# Patient Record
Sex: MALE | Race: ASIAN | NOT HISPANIC OR LATINO | Employment: FULL TIME | URBAN - METROPOLITAN AREA
[De-identification: names, ages, dates, MRNs, and addresses within clinical notes are randomized per-mention and may not be internally consistent; named-entity substitution may affect disease eponyms.]

---

## 2021-07-06 ENCOUNTER — TRANSFERRED RECORDS (OUTPATIENT)
Dept: MULTI SPECIALTY CLINIC | Facility: CLINIC | Age: 46
End: 2021-07-06

## 2022-07-25 ENCOUNTER — OFFICE VISIT (OUTPATIENT)
Dept: FAMILY MEDICINE | Facility: CLINIC | Age: 47
End: 2022-07-25
Payer: COMMERCIAL

## 2022-07-25 VITALS
WEIGHT: 190 LBS | HEART RATE: 86 BPM | DIASTOLIC BLOOD PRESSURE: 104 MMHG | SYSTOLIC BLOOD PRESSURE: 150 MMHG | OXYGEN SATURATION: 98 % | HEIGHT: 63 IN | BODY MASS INDEX: 33.66 KG/M2 | RESPIRATION RATE: 18 BRPM | TEMPERATURE: 97.1 F

## 2022-07-25 DIAGNOSIS — D22.9 BENIGN PIGMENTED MOLE: ICD-10-CM

## 2022-07-25 DIAGNOSIS — I10 ESSENTIAL HYPERTENSION: ICD-10-CM

## 2022-07-25 DIAGNOSIS — K57.90 DIVERTICULOSIS: ICD-10-CM

## 2022-07-25 DIAGNOSIS — K52.9 GASTROENTERITIS: ICD-10-CM

## 2022-07-25 DIAGNOSIS — R10.32 LLQ ABDOMINAL PAIN: Primary | ICD-10-CM

## 2022-07-25 LAB
BASOPHILS # BLD AUTO: 0 10E3/UL (ref 0–0.2)
BASOPHILS NFR BLD AUTO: 0 %
EOSINOPHIL # BLD AUTO: 0.4 10E3/UL (ref 0–0.7)
EOSINOPHIL NFR BLD AUTO: 5 %
ERYTHROCYTE [DISTWIDTH] IN BLOOD BY AUTOMATED COUNT: 13.2 % (ref 10–15)
HCT VFR BLD AUTO: 41.9 % (ref 40–53)
HGB BLD-MCNC: 13.9 G/DL (ref 13.3–17.7)
IMM GRANULOCYTES # BLD: 0.1 10E3/UL
IMM GRANULOCYTES NFR BLD: 1 %
LYMPHOCYTES # BLD AUTO: 2.9 10E3/UL (ref 0.8–5.3)
LYMPHOCYTES NFR BLD AUTO: 36 %
MCH RBC QN AUTO: 28 PG (ref 26.5–33)
MCHC RBC AUTO-ENTMCNC: 33.2 G/DL (ref 31.5–36.5)
MCV RBC AUTO: 84 FL (ref 78–100)
MONOCYTES # BLD AUTO: 0.6 10E3/UL (ref 0–1.3)
MONOCYTES NFR BLD AUTO: 7 %
NEUTROPHILS # BLD AUTO: 4 10E3/UL (ref 1.6–8.3)
NEUTROPHILS NFR BLD AUTO: 51 %
PLATELET # BLD AUTO: 375 10E3/UL (ref 150–450)
RBC # BLD AUTO: 4.97 10E6/UL (ref 4.4–5.9)
WBC # BLD AUTO: 7.9 10E3/UL (ref 4–11)

## 2022-07-25 PROCEDURE — 99204 OFFICE O/P NEW MOD 45 MIN: CPT | Performed by: INTERNAL MEDICINE

## 2022-07-25 PROCEDURE — 36415 COLL VENOUS BLD VENIPUNCTURE: CPT | Performed by: INTERNAL MEDICINE

## 2022-07-25 PROCEDURE — 85025 COMPLETE CBC W/AUTO DIFF WBC: CPT | Performed by: INTERNAL MEDICINE

## 2022-07-25 RX ORDER — AMLODIPINE BESYLATE 5 MG/1
5 TABLET ORAL DAILY
Qty: 90 TABLET | Refills: 3 | Status: SHIPPED | OUTPATIENT
Start: 2022-07-25 | End: 2023-07-24

## 2022-07-25 RX ORDER — ESCITALOPRAM OXALATE 5 MG/1
5 TABLET ORAL DAILY
COMMUNITY
Start: 2022-05-03 | End: 2024-08-09

## 2022-07-25 RX ORDER — CIPROFLOXACIN 500 MG/1
500 TABLET, FILM COATED ORAL 2 TIMES DAILY
Qty: 10 TABLET | Refills: 0 | Status: SHIPPED | OUTPATIENT
Start: 2022-07-25 | End: 2022-07-30

## 2022-07-25 ASSESSMENT — PAIN SCALES - GENERAL: PAINLEVEL: MODERATE PAIN (4)

## 2022-07-25 ASSESSMENT — PATIENT HEALTH QUESTIONNAIRE - PHQ9: SUM OF ALL RESPONSES TO PHQ QUESTIONS 1-9: 1

## 2022-07-25 NOTE — PROGRESS NOTES
Lenore Ortiz is a 46 year old, presenting for the following health issues:  Abdominal Pain and Health Maintenance (Last Colonoscopy done on 07/06/2021 at [facility])      HPI     Chief Complaint:     LLQ abdominal pains, gastroenteritis    HPI:     LLQ abdominal Pains    Duration:     Since: recent            Specific cause: gastroenteritis    Description:      Location of pain: LLQ abdomen     Character of pain: dull     Pain radiation: none    Intensity: moderate    History:      Pain interferes with job: yes     History of similar pain problems: yes      Any previous MRI or X-rays: no     Therapies tried without relief: none    Alleviating factors:      Improved by: none    Precipitating factors:    Worsened by: none    Accompanying Signs & Symptoms: loose stools            Current Medications:     Current Outpatient Medications   Medication Sig Dispense Refill     amLODIPine (NORVASC) 5 MG tablet Take 1 tablet (5 mg) by mouth daily 90 tablet 3     ciprofloxacin (CIPRO) 500 MG tablet Take 1 tablet (500 mg) by mouth 2 times daily for 5 days 10 tablet 0     escitalopram (LEXAPRO) 5 MG tablet Take 5 mg by mouth daily           Allergies:      Allergies   Allergen Reactions     No Known Allergies             Past Medical History:     Past Medical History:   Diagnosis Date     Diverticulosis of large intestine      History of colonic polyps          Past Surgical History:   No past surgical history on file.      Family Medical History:     Family History   Problem Relation Age of Onset     No Known Problems Mother      No Known Problems Father          Social History:     Social History     Socioeconomic History     Marital status:      Spouse name: Not on file     Number of children: Not on file     Years of education: Not on file     Highest education level: Not on file   Occupational History     Not on file   Tobacco Use     Smoking status: Never Smoker     Smokeless tobacco: Never Used  "  Substance and Sexual Activity     Alcohol use: Yes     Comment: 0-1 drink a month     Drug use: Never     Sexual activity: Yes     Partners: Male   Other Topics Concern     Not on file   Social History Narrative     Not on file     Social Determinants of Health     Financial Resource Strain: Not on file   Food Insecurity: Not on file   Transportation Needs: Not on file   Physical Activity: Not on file   Stress: Not on file   Social Connections: Not on file   Intimate Partner Violence: Not on file   Housing Stability: Not on file           Review of System:     Constitutional: Negative for fever or chills  Skin: positive for multiple moles  Ears/Nose/Throat: Negative for nasal congestion, sore throat  Respiratory: No shortness of breath, dyspnea on exertion, cough, or hemoptysis  Cardiovascular: Negative for chest pain  Gastrointestinal: positive for LLQ abdominal pains, loose stools due to gastroenteritis symptoms  Genitourinary: Negative for dysuria, hematuria  Musculoskeletal: Negative for myalgias  Neurologic: Negative for headaches  Psychiatric: Negative for depression, anxiety  Hematologic/Lymphatic/Immunologic: Negative  Endocrine: Negative  Behavioral: Negative for tobacco use       Physical Exam:   BP (!) 150/104   Pulse 86   Temp 97.1  F (36.2  C) (Temporal)   Resp 18   Ht 1.593 m (5' 2.7\")   Wt 86.2 kg (190 lb)   SpO2 98%   BMI 33.98 kg/m      GENERAL: alert and no distress  EYES: eyes grossly normal to inspection, and conjunctivae and sclerae normal  HENT: Normocephalic atraumatic. Nose and mouth without ulcers or lesions  NECK: supple  RESP: lungs clear to auscultation   CV: regular rate and rhythm, normal S1 S2  LYMPH: no peripheral edema   ABDOMEN: nondistended, LLQ abdominal pains present  MS: no gross musculoskeletal defects noted  SKIN: multiple moles present  NEURO: Alert & Oriented x 3.   PSYCH: mentation appears normal, affect normal        Diagnostic Test Results:     Diagnostic Test " Results:  Labs reviewed in Epic  Results for orders placed or performed in visit on 07/25/22   XR Abdomen 2 Views     Status: None    Narrative    XR ABDOMEN 2 VIEWS   7/25/2022 9:59 AM     HISTORY: recent left lower abdominal pains; LLQ abdominal pain    COMPARISON: None.      Impression    IMPRESSION: No free air. Nonobstructive bowel gas pattern. Moderate  amount of formed stool in the cecum and ascending colon. No  radiographically evident renal calculi. Few pelvic phleboliths.    CAROLINE MCCAULEY MD         SYSTEM ID:  [id number]   Results for orders placed or performed in visit on 07/25/22   CBC with platelets and differential     Status: None   Result Value Ref Range    WBC Count 7.9 4.0 - 11.0 10e3/uL    RBC Count 4.97 4.40 - 5.90 10e6/uL    Hemoglobin 13.9 13.3 - 17.7 g/dL    Hematocrit 41.9 40.0 - 53.0 %    MCV 84 78 - 100 fL    MCH 28.0 26.5 - 33.0 pg    MCHC 33.2 31.5 - 36.5 g/dL    RDW 13.2 10.0 - 15.0 %    Platelet Count 375 150 - 450 10e3/uL    % Neutrophils 51 %    % Lymphocytes 36 %    % Monocytes 7 %    % Eosinophils 5 %    % Basophils 0 %    % Immature Granulocytes 1 %    Absolute Neutrophils 4.0 1.6 - 8.3 10e3/uL    Absolute Lymphocytes 2.9 0.8 - 5.3 10e3/uL    Absolute Monocytes 0.6 0.0 - 1.3 10e3/uL    Absolute Eosinophils 0.4 0.0 - 0.7 10e3/uL    Absolute Basophils 0.0 0.0 - 0.2 10e3/uL    Absolute Immature Granulocytes 0.1 <=0.4 10e3/uL   CBC with platelets and differential     Status: None    Narrative    The following orders were created for panel order CBC with platelets and differential.  Procedure                               Abnormality         Status                     ---------                               -----------         ------                     CBC with platelets and d...[[id number]]                      Final result                 Please view results for these tests on the individual orders.         ASSESSMENT/PLAN:       Angel was seen today for abdominal pain and health  maintenance.    Diagnoses and all orders for this visit:    LLQ abdominal pain  -     XR Abdomen 2 Views; Future  -     CBC with platelets and differential; Future  -     CBC with platelets and differential    Essential hypertension  -     amLODIPine (NORVASC) 5 MG tablet; Take 1 tablet (5 mg) by mouth daily    Benign pigmented mole  -     Adult Dermatology Referral; Future    Gastroenteritis  -     ciprofloxacin (CIPRO) 500 MG tablet; Take 1 tablet (500 mg) by mouth 2 times daily for 5 days    Diverticulosis    Other orders  -     REVIEW OF HEALTH MAINTENANCE PROTOCOL ORDERS            Follow Up Plan:     Patient is instructed to return to Internal Medicine clinic for follow-up visit in 1 week.        Ramandeep Lucas MD  Internal Medicine  [facility]

## 2022-07-25 NOTE — Clinical Note
Please abstract the following data from this visit with this patient into the appropriate field in Epic:  Tests that can be patient reported without a hard copy:  Colonoscopy done on this date: 07/06/2021 (approximately), by this group: [facility]

## 2022-07-25 NOTE — NURSING NOTE
"    Initial BP:  BP (!) 150/104   Pulse 86   Temp 97.1  F (36.2  C) (Temporal)   Resp 18   Ht 1.593 m (5' 2.7\")   Wt 86.2 kg (190 lb)   SpO2 98%   BMI 33.98 kg/m       86  Disposition: provider notified while patient in the clinic        Carlyn Das CMA    "

## 2022-10-03 ENCOUNTER — HEALTH MAINTENANCE LETTER (OUTPATIENT)
Age: 47
End: 2022-10-03

## 2023-04-07 ENCOUNTER — TRANSFERRED RECORDS (OUTPATIENT)
Dept: MULTI SPECIALTY CLINIC | Facility: CLINIC | Age: 48
End: 2023-04-07

## 2023-04-07 LAB — RETINOPATHY: NORMAL

## 2023-09-04 ASSESSMENT — ENCOUNTER SYMPTOMS
COUGH: 1
DIZZINESS: 1
NERVOUS/ANXIOUS: 1
CHILLS: 1
HEMATOCHEZIA: 1
SORE THROAT: 1
HEARTBURN: 1
WEAKNESS: 1
BREAST MASS: 0

## 2023-09-05 ASSESSMENT — ENCOUNTER SYMPTOMS
COUGH: 1
WEAKNESS: 1
NERVOUS/ANXIOUS: 1
SORE THROAT: 1
DIZZINESS: 1
CHILLS: 1

## 2023-09-05 NOTE — PROGRESS NOTES
SUBJECTIVE:   CC: Angel is an 47 year old who presents for preventative health visit.       Healthy Habits:     Getting at least 3 servings of Calcium per day:  NO    Bi-annual eye exam:  Yes    Dental care twice a year:  Yes    Sleep apnea or symptoms of sleep apnea:  None    Diet:  Low fat/cholesterol    Duration of exercise:  15-30 minutes    Taking medications regularly:  Yes    Medication side effects:  None    Additional concerns today:  Yes      Have you ever done Advance Care Planning? (For example, a Health Directive, POLST, or a discussion with a medical provider or your loved ones about your wishes): No, advance care planning information given to patient to review.  Patient plans to discuss their wishes with loved ones or provider.      Social History     Tobacco Use    Smoking status: Never    Smokeless tobacco: Never   Substance Use Topics    Alcohol use: Yes     Comment: 0-1 drink a month             9/4/2023     3:34 PM   Alcohol Use   Prescreen: >3 drinks/day or >7 drinks/week? No          No data to display                Last PSA: No results found for: PSA    Reviewed orders with patient. Reviewed health maintenance and updated orders accordingly - Yes  Lab work is in process  Labs reviewed in EPIC    Reviewed and updated as needed this visit by clinical staff                  Reviewed and updated as needed this visit by Provider                 Past Medical History:   Diagnosis Date    Diverticulosis of large intestine     History of colonic polyps         Review of Systems   Constitutional:  Positive for chills.   HENT:  Positive for congestion and sore throat.    Respiratory:  Positive for cough.    Cardiovascular:  Positive for chest pain.   Neurological:  Positive for dizziness and weakness.   Psychiatric/Behavioral:  The patient is nervous/anxious.      Dizziness   Angel Mohr has been feeling a bit more dizzy.  He is trying to walk more and would ensure that he is medically optimized to  "resume exercise.    Hypertension   Angel Mohr has been taking amlodipine 5 mg since once year ago.  Has not been checking blood pressure at home.  Tolerating amlodipine without difficulty/he notes occasional chest pain which is fleeting and not exertional        OBJECTIVE:   BP (!) 146/98 (BP Location: Right arm, Patient Position: Sitting, Cuff Size: Adult Regular)   Pulse 86   Temp 98.2  F (36.8  C) (Oral)   Resp 16   Ht 1.575 m (5' 2\")   Wt 85.9 kg (189 lb 4.8 oz)   SpO2 99%   BMI 34.62 kg/m      Physical Exam  GENERAL: healthy, alert and no distress  EYES: Eyes grossly normal to inspection,    HENT: ear canals and TM's normal, nose and mouth without ulcers or lesions  NECK: no adenopathy, no asymmetry, masses, or scars and thyroid normal to palpation  RESP: lungs clear to auscultation - no rales, rhonchi or wheezes  CV: regular rate and rhythm, normal S1 S2, no S3 or S4, no murmur, click or rub, no peripheral edema   ABDOMEN: Obese, soft, nontender, no hepatosplenomegaly   MS: no gross musculoskeletal defects noted, no edema  SKIN: no suspicious lesions or rashes  NEURO: Normal strength and tone, mentation intact and speech normal  PSYCH: mentation appears normal, affect normal/bright    Diagnostic Test Results:  Labs reviewed in Epic    ASSESSMENT/PLAN:     Patient Instructions   (Z00.00) Routine general medical examination at a health care facility  (primary encounter diagnosis)  Comment: For routine exam, we will draw labs as ordered, cholesterol, diabetes mellitus check, liver function, renal function, PSA. We will also update vaccination history.  Plan: Lipid panel reflex to direct LDL Non-fasting,         Comprehensive metabolic panel, Lipid panel         reflex to direct LDL Fasting, EKG 12-lead         complete w/read - Clinics, Hemoglobin A1c, CBC         with platelets, HEP A & B ([facility]), PSA,         screen            (Z11.4) Screening for HIV (human immunodeficiency virus)  Comment: "   Plan: HIV Antigen Antibody Combo            (Z11.59) Need for hepatitis C screening test  Comment:   Plan: Hepatitis C Screen Reflex to HCV RNA Quant and         Genotype            (I10) Essential hypertension  Comment: blood pressure is a bit elevated and we will plan to start exercise program and recheck in 2 months  Plan: amLODIPine (NORVASC) 5 MG tablet, EKG 12-lead         complete w/read - Clinics            (F41.9,  F32.A) Anxiety and depression  Comment: Continue follow up in psychiatry and we will check EKG   Plan: EKG 12-lead complete w/read - Clinics            (E66.9,  Z68.34) Class 1 obesity without serious comorbidity with body mass index (BMI) of 34.0 to 34.9 in adult, unspecified obesity type  Comment: Recommend self-directed dieting   Plan:     Chest Pain  Comment; given recent chest pain we will request exercise stress echocardiogram [facility] - [phone]         Patient has been advised of split billing requirements and indicates understanding: Yes      COUNSELING:   Reviewed preventive health counseling, as reflected in patient instructions        Angel Mohr reports that Angel Mohr has never smoked. Angel AXELUmang Roblesliaramesh has never used smokeless tobacco.            Chintan Marinelli MD, MD  [facility] submitted by the patient for this visit:  Annual Preventive Visit (Submitted on 9/4/2023)  Chief Complaint: Annual Exam:  Blood in stool: Yes  heartburn: Yes  pelvic pain: No  vaginal bleeding: No  vaginal discharge: No  tenderness: No  breast mass: No  breast discharge: No  impotence: No  penile discharge: No

## 2023-09-06 ENCOUNTER — OFFICE VISIT (OUTPATIENT)
Dept: FAMILY MEDICINE | Facility: CLINIC | Age: 48
End: 2023-09-06
Payer: COMMERCIAL

## 2023-09-06 ENCOUNTER — TELEPHONE (OUTPATIENT)
Dept: FAMILY MEDICINE | Facility: CLINIC | Age: 48
End: 2023-09-06

## 2023-09-06 VITALS
SYSTOLIC BLOOD PRESSURE: 146 MMHG | TEMPERATURE: 98.2 F | BODY MASS INDEX: 34.83 KG/M2 | HEIGHT: 62 IN | OXYGEN SATURATION: 99 % | HEART RATE: 86 BPM | DIASTOLIC BLOOD PRESSURE: 98 MMHG | RESPIRATION RATE: 16 BRPM | WEIGHT: 189.3 LBS

## 2023-09-06 DIAGNOSIS — R07.89 OTHER CHEST PAIN: ICD-10-CM

## 2023-09-06 DIAGNOSIS — I10 ESSENTIAL HYPERTENSION: ICD-10-CM

## 2023-09-06 DIAGNOSIS — Z00.00 ROUTINE GENERAL MEDICAL EXAMINATION AT A HEALTH CARE FACILITY: Primary | ICD-10-CM

## 2023-09-06 DIAGNOSIS — Z11.59 NEED FOR HEPATITIS C SCREENING TEST: ICD-10-CM

## 2023-09-06 DIAGNOSIS — E66.811 CLASS 1 OBESITY WITHOUT SERIOUS COMORBIDITY WITH BODY MASS INDEX (BMI) OF 34.0 TO 34.9 IN ADULT, UNSPECIFIED OBESITY TYPE: ICD-10-CM

## 2023-09-06 DIAGNOSIS — F32.A ANXIETY AND DEPRESSION: ICD-10-CM

## 2023-09-06 DIAGNOSIS — F41.9 ANXIETY AND DEPRESSION: ICD-10-CM

## 2023-09-06 DIAGNOSIS — Z11.4 SCREENING FOR HIV (HUMAN IMMUNODEFICIENCY VIRUS): ICD-10-CM

## 2023-09-06 LAB
ALBUMIN SERPL BCG-MCNC: 4.6 G/DL (ref 3.5–5.2)
ALP SERPL-CCNC: 91 U/L (ref 35–129)
ALT SERPL W P-5'-P-CCNC: 45 U/L (ref 0–70)
ANION GAP SERPL CALCULATED.3IONS-SCNC: 13 MMOL/L (ref 7–15)
AST SERPL W P-5'-P-CCNC: 32 U/L (ref 0–45)
BILIRUB SERPL-MCNC: 0.4 MG/DL
BUN SERPL-MCNC: 15.4 MG/DL (ref 6–20)
CALCIUM SERPL-MCNC: 9.8 MG/DL (ref 8.6–10)
CHLORIDE SERPL-SCNC: 103 MMOL/L (ref 98–107)
CHOLEST SERPL-MCNC: 198 MG/DL
CREAT SERPL-MCNC: 1.37 MG/DL (ref 0.51–1.17)
DEPRECATED HCO3 PLAS-SCNC: 24 MMOL/L (ref 22–29)
EGFRCR SERPLBLD CKD-EPI 2021: 63 ML/MIN/1.73M2
ERYTHROCYTE [DISTWIDTH] IN BLOOD BY AUTOMATED COUNT: 13.4 % (ref 10–15)
GLUCOSE SERPL-MCNC: 116 MG/DL (ref 70–99)
HBA1C MFR BLD: 6.7 % (ref 0–5.6)
HCT VFR BLD AUTO: 43.2 % (ref 35–53)
HCV AB SERPL QL IA: NONREACTIVE
HDLC SERPL-MCNC: 39 MG/DL
HGB BLD-MCNC: 14.1 G/DL (ref 11.7–17.7)
HIV 1+2 AB+HIV1 P24 AG SERPL QL IA: NONREACTIVE
LDLC SERPL CALC-MCNC: 121 MG/DL
MCH RBC QN AUTO: 27.6 PG (ref 26.5–33)
MCHC RBC AUTO-ENTMCNC: 32.6 G/DL (ref 31.5–36.5)
MCV RBC AUTO: 85 FL (ref 78–100)
NONHDLC SERPL-MCNC: 159 MG/DL
PLATELET # BLD AUTO: 344 10E3/UL (ref 150–450)
POTASSIUM SERPL-SCNC: 4.3 MMOL/L (ref 3.4–5.3)
PROT SERPL-MCNC: 7.6 G/DL (ref 6.4–8.3)
PSA SERPL DL<=0.01 NG/ML-MCNC: 0.9 NG/ML (ref 0–2.5)
RBC # BLD AUTO: 5.1 10E6/UL (ref 3.8–5.9)
SODIUM SERPL-SCNC: 140 MMOL/L (ref 136–145)
TRIGL SERPL-MCNC: 188 MG/DL
WBC # BLD AUTO: 6.9 10E3/UL (ref 4–11)

## 2023-09-06 PROCEDURE — 80053 COMPREHEN METABOLIC PANEL: CPT | Performed by: INTERNAL MEDICINE

## 2023-09-06 PROCEDURE — 83036 HEMOGLOBIN GLYCOSYLATED A1C: CPT | Performed by: INTERNAL MEDICINE

## 2023-09-06 PROCEDURE — 90636 HEP A/HEP B VACC ADULT IM: CPT | Performed by: INTERNAL MEDICINE

## 2023-09-06 PROCEDURE — 99396 PREV VISIT EST AGE 40-64: CPT | Mod: 25 | Performed by: INTERNAL MEDICINE

## 2023-09-06 PROCEDURE — G0103 PSA SCREENING: HCPCS | Performed by: INTERNAL MEDICINE

## 2023-09-06 PROCEDURE — 36415 COLL VENOUS BLD VENIPUNCTURE: CPT | Performed by: INTERNAL MEDICINE

## 2023-09-06 PROCEDURE — 85027 COMPLETE CBC AUTOMATED: CPT | Performed by: INTERNAL MEDICINE

## 2023-09-06 PROCEDURE — 80061 LIPID PANEL: CPT | Performed by: INTERNAL MEDICINE

## 2023-09-06 PROCEDURE — 90471 IMMUNIZATION ADMIN: CPT | Performed by: INTERNAL MEDICINE

## 2023-09-06 PROCEDURE — 93000 ELECTROCARDIOGRAM COMPLETE: CPT | Performed by: INTERNAL MEDICINE

## 2023-09-06 PROCEDURE — 86803 HEPATITIS C AB TEST: CPT | Performed by: INTERNAL MEDICINE

## 2023-09-06 PROCEDURE — 87389 HIV-1 AG W/HIV-1&-2 AB AG IA: CPT | Performed by: INTERNAL MEDICINE

## 2023-09-06 PROCEDURE — 99214 OFFICE O/P EST MOD 30 MIN: CPT | Mod: 25 | Performed by: INTERNAL MEDICINE

## 2023-09-06 RX ORDER — AMLODIPINE BESYLATE 5 MG/1
5 TABLET ORAL DAILY
Qty: 90 TABLET | Refills: 3 | Status: SHIPPED | OUTPATIENT
Start: 2023-09-06 | End: 2023-11-21

## 2023-09-06 ASSESSMENT — PAIN SCALES - GENERAL: PAINLEVEL: NO PAIN (0)

## 2023-09-06 NOTE — TELEPHONE ENCOUNTER
Can we call Angel REYNA Fani and let him know that     Hi Angel,    I had the opportunity to review your recent labs and a summary of your labs reads as follows:    Your complete blood counts show no sign of anemia, normal white blood cell count and platelets.  Your comprehensive metabolic panel showed evidence of elevated creatinine and elevated fasting blood glucose.  Your A1c is also quite elevated consistent with a new diagnosis of type 2 diabetes mellitus.  As this is unexpected I recommend we schedule a virtual visit within the next week or 2 to review this  Your fasting lipid panel show  -Low HDL (good) cholesterol -as your goal is greater than 40  - low LDL (bad) cholesterol as your goal is less than 130  -Elevated triglyceride levels  Your PSA level is also stable indicating no evidence of prostate cancer        Sincerely,  Chintan Marinelli MD

## 2023-09-06 NOTE — PATIENT INSTRUCTIONS
(Z00.00) Routine general medical examination at a health care facility  (primary encounter diagnosis)  Comment: For routine exam, we will draw labs as ordered, cholesterol, diabetes mellitus check, liver function, renal function, PSA. We will also update vaccination history.  Plan: Lipid panel reflex to direct LDL Non-fasting,         Comprehensive metabolic panel, Lipid panel         reflex to direct LDL Fasting, EKG 12-lead         complete w/read - Clinics, Hemoglobin A1c, CBC         with platelets, HEP A & B ([facility]), PSA,         screen            (Z11.4) Screening for HIV (human immunodeficiency virus)  Comment:   Plan: HIV Antigen Antibody Combo            (Z11.59) Need for hepatitis C screening test  Comment:   Plan: Hepatitis C Screen Reflex to HCV RNA Quant and         Genotype            (I10) Essential hypertension  Comment: blood pressure is a bit elevated and we will plan to start exercise program and recheck in 2 months  Plan: amLODIPine (NORVASC) 5 MG tablet, EKG 12-lead         complete w/read - Clinics            (F41.9,  F32.A) Anxiety and depression  Comment: Continue follow up in psychiatry and we will check EKG   Plan: EKG 12-lead complete w/read - Clinics            (E66.9,  Z68.34) Class 1 obesity without serious comorbidity with body mass index (BMI) of 34.0 to 34.9 in adult, unspecified obesity type  Comment: Recommend self-directed dieting   Plan:     Chest Pain  Comment; given recent chest pain we will request exercise stress echocardiogram [facility] - [phone]

## 2023-09-06 NOTE — RESULT ENCOUNTER NOTE
Bret Ortiz,    I had the opportunity to review your recent labs and a summary of your labs reads as follows:    Your complete blood counts show no sign of anemia, normal white blood cell count and platelets.  Your comprehensive metabolic panel showed evidence of elevated creatinine and elevated fasting blood glucose.  Your A1c is also quite elevated consistent with a new diagnosis of type 2 diabetes mellitus.  As this is unexpected I recommend we schedule a virtual visit within the next week or 2 to review this  Your fasting lipid panel show  -Low HDL (good) cholesterol -as your goal is greater than 40  - low LDL (bad) cholesterol as your goal is less than 130  -Elevated triglyceride levels  Your PSA level is also stable indicating no evidence of prostate cancer      Sincerely,  Chintan Marinelli MD

## 2023-09-07 NOTE — TELEPHONE ENCOUNTER
Patient Contact    Attempt # 2    Was call answered? No.    Left message for patient to call triage back.    Sonia Khan RN

## 2023-09-07 NOTE — TELEPHONE ENCOUNTER
Triage Patient Outreach    Attempt # 1    Was call answered?  No.  Left message to return call to clinic to schedule.  Please schedule in next avail virtual with DR. Marinelli next two weeks.     May have questions about new Dx, but did see mychart result.     Laurie Obando RN

## 2023-09-08 NOTE — TELEPHONE ENCOUNTER
Pt did call back to schedule.  Laurie Obando, RN  [facility] RN Triage Team

## 2023-09-26 ENCOUNTER — VIRTUAL VISIT (OUTPATIENT)
Dept: FAMILY MEDICINE | Facility: CLINIC | Age: 48
End: 2023-09-26
Payer: COMMERCIAL

## 2023-09-26 DIAGNOSIS — E11.9 TYPE 2 DIABETES MELLITUS WITHOUT COMPLICATION, WITHOUT LONG-TERM CURRENT USE OF INSULIN (H): ICD-10-CM

## 2023-09-26 DIAGNOSIS — E78.5 HYPERLIPIDEMIA LDL GOAL <100: Primary | ICD-10-CM

## 2023-09-26 PROCEDURE — 99213 OFFICE O/P EST LOW 20 MIN: CPT | Mod: VID | Performed by: INTERNAL MEDICINE

## 2023-09-26 RX ORDER — ATORVASTATIN CALCIUM 20 MG/1
20 TABLET, FILM COATED ORAL DAILY
Qty: 90 TABLET | Refills: 3 | Status: SHIPPED | OUTPATIENT
Start: 2023-09-26 | End: 2024-08-09

## 2023-09-26 NOTE — PROGRESS NOTES
Angel is a 48 year old who is being evaluated via a billable video visit.      How would you like to obtain your AVS? MyChart  If the video visit is dropped, the invitation should be resent by: Text to cell phone: [phone]  Will anyone else be joining your video visit? No            Subjective   Angel is a 48 year old, presenting for the following health issues:  Follow Up      History of Present Illness       Diabetes:   Angel Mohr presents for follow up of diabetes.    Angel Mohr is not checking blood glucose.        Angel Mohr is concerned about frequent infections, blood sugar frequently over 200 and low blood sugar, several less than 70 in the past few weeks.   Angel Mohr is having excessive thirst and weight gain.            Angel Mohr eats 0-1 servings of fruits and vegetables daily.Angel Mohr consumes 1 sweetened beverage(s) daily.Angel Mohr exercises with enough effort to increase Angel Mckeons heart rate 10 to 19 minutes per day.  Angel Mohr exercises with enough effort to increase Angel Mckeons heart rate 3 or less days per week. Angel Mohr is missing 1 dose(s) of medications per week.  Angel Mohr is not taking prescribed medications regularly due to remembering to take.         Review of Systems   Constitutional, HEENT, cardiovascular, pulmonary, GI, , musculoskeletal, neuro, skin, endocrine and psych systems are negative, except as otherwise noted.      Objective           Vitals:  No vitals were obtained today due to virtual visit.    Physical Exam   GENERAL: Healthy, alert and no distress  EYES: Eyes grossly normal to inspection.  No discharge or erythema, or obvious scleral/conjunctival abnormalities.  RESP: No audible wheeze, cough, or visible cyanosis.  No visible retractions or increased work of breathing.    SKIN: Visible skin clear. No significant rash, abnormal pigmentation or lesions.  NEURO: Cranial nerves grossly intact.  Mentation and speech  appropriate for age.  PSYCH: Mentation appears normal, affect normal/bright, judgement and insight intact, normal speech and appearance well-groomed.      (E78.5) Hyperlipidemia LDL goal <100  (primary encounter diagnosis)  Comment: We will start atorvastatin 20 mg daily and recheck fasting lipid panel and liver function tests in 3 months.  Plan: atorvastatin (LIPITOR) 20 MG tablet            (E11.9) Type 2 diabetes mellitus without complication, without long-term current use of insulin (H)  Comment: Discussed options for management and will continue with diet and exercise.  We will refer to diabetic education  Plan: [facility]B Adult Diabetes Educator Referral                 Video-Visit Details    Type of service:  Video Visit     Originating Location (pt. Location): Home    Distant Location (provider location):  On-site  Platform used for Video Visit: OnTheList  Start Time: 4:56 PM  End Time; 5:19 PM

## 2023-10-25 ENCOUNTER — VIRTUAL VISIT (OUTPATIENT)
Dept: EDUCATION SERVICES | Facility: CLINIC | Age: 48
End: 2023-10-25
Payer: COMMERCIAL

## 2023-10-25 DIAGNOSIS — E11.9 TYPE 2 DIABETES MELLITUS WITHOUT COMPLICATION, WITHOUT LONG-TERM CURRENT USE OF INSULIN (H): Primary | ICD-10-CM

## 2023-10-25 PROCEDURE — G0108 DIAB MANAGE TRN  PER INDIV: HCPCS | Mod: VID | Performed by: DIETITIAN, REGISTERED

## 2023-10-25 RX ORDER — LANCETS
EACH MISCELLANEOUS
Qty: 100 EACH | Refills: 11 | Status: SHIPPED | OUTPATIENT
Start: 2023-10-25

## 2023-10-25 RX ORDER — BLOOD SUGAR DIAGNOSTIC
STRIP MISCELLANEOUS
Qty: 100 STRIP | Refills: 11 | Status: SHIPPED | OUTPATIENT
Start: 2023-10-25

## 2023-10-25 RX ORDER — BLOOD-GLUCOSE METER
1 EACH MISCELLANEOUS ONCE
Qty: 1 KIT | Refills: 0 | Status: SHIPPED | OUTPATIENT
Start: 2023-10-25 | End: 2023-10-25

## 2023-10-25 NOTE — LETTER
10/25/2023         RE: Angel Mohr  [address] [place] [address] [address]  [facility] [address]        Dear Colleague,    Thank you for referring your patient, Angel Mohr, to the [facility]. Please see a copy of my visit note below.    Diabetes Self-Management Education & Support    Presents for: Initial Assessment for new diagnosis  Type of service:  Video Visit  Originating Location (pt. Location): Home  Distant Location (provider location): Offsite  Mode of Communication:  Video Conference via [employer]  Joined the call at 10/25/2023, 1:05:16 pm.  Left the call at 10/25/2023, 1:41:02 pm.  You were on the call for 35 minutes 46 seconds   How would patient like to obtain AVS? MyChart    ASSESSMENT:  New diagnosis, caught early with A1c of 6.7.  Angel was not surprised; loves sweets and was already to quickly take these out.  2-3 regular sodas/day and cut out candy.  Suspect blood sugars improved markedly with this.  Reviewed carbohydrate counting, label reading, healthy eating in general etc.  Discussed a glucometer and blood sugar targets.  Discussed aggressive glycemic management and early intervention for halting disease process.   Angel has an excellent attitude/outlook and is motivated to make major changes to improve health, lose weight and has already been increasing activity.      Patient's most recent   Lab Results   Component Value Date    A1C 6.7 09/06/2023     is not meeting goal of <7.0  (aim for < 6.5 or < 6 in early Diabetes)     Diabetes knowledge and skills assessment:   Patient is knowledgeable in diabetes management concepts related to: Healthy Eating, Being Active, Monitoring, and Taking Medication  Continue education with the following diabetes management concepts: Problem Solving, Reducing Risks, and Healthy Coping  Based on learning assessment above, most appropriate setting for further diabetes education would be: Individual setting.      PLAN  Follow  "up scheduled 12/13  Glucometer to pharmacy   Continue with positive diet & lifestyle changes       Education Materials Provided:  [employer] Healthy Living with Diabetes Book    SUBJECTIVE/OBJECTIVE:  Presents for: Initial Assessment for new diagnosis  Accompanied by: Self  Diabetes education in the past 24mo: No  Focus of Visit: Monitoring, Reducing Risks, Taking Medication, Healthy Coping  Diabetes type: Type 2  Disease course: Improving  How confident are you filling out medical forms by yourself:: Extremely  Other concerns:: None  Cultural Influences/Ethnic Background:  Not  or     Diabetes Symptoms & Complications:          Patient Problem List and Family Medical History reviewed for relevant medical history, current medical status, and diabetes risk factors.    Vitals:  There were no vitals taken for this visit.  Estimated body mass index is 34.62 kg/m  as calculated from the following:    Height as of 9/6/23: 1.575 m (5' 2\").    Weight as of 9/6/23: 85.9 kg (189 lb 4.8 oz).   Last 3 BP:   BP Readings from Last 3 Encounters:   09/06/23 (!) 146/98   07/25/22 (!) 150/104       History   Smoking Status     Never   Smokeless Tobacco     Never       Labs:  Lab Results   Component Value Date    A1C 6.7 09/06/2023     Lab Results   Component Value Date     09/06/2023     Lab Results   Component Value Date     09/06/2023     Direct Measure HDL   Date Value Ref Range Status   09/06/2023 39 (L) >=40 mg/dL Final   ]  GFR Estimate   Date Value Ref Range Status   09/06/2023 63 >60 mL/min/1.73m2 Final     Comment:     The generation of the estimated GFR is currently based on binary male or female sex. If the electronic health record information indicates another gender identity or if Legal Sex is recorded as \"Unknown\", GFR estimates are not automatically calculated, and application of GFR equations or a direct GFR measurement should be considered according to the individual's appropriate " "clinical context.     No results found for: \"GFRESTBLACK\"  Lab Results   Component Value Date    CR 1.37 09/06/2023     No results found for: \"MICROALBUMIN\"    Healthy Eating:  Healthy Eating Assessed Today: Yes  Meal planning/habits: Avoiding sweets, Smaller portions    Being Active:  Being Active Assessed Today: Yes  Exercise:: Yes    Monitoring:  Monitoring Assessed Today: Yes    Taking Medications:  Taking Medication Assessed Today: Yes    Problem Solving:  Not assessed at this visit     Reducing Risks:  Reducing Risks Assessed Today: Yes    Healthy Coping:  Healthy Coping Assessed Today: Yes  Emotional response to diabetes: Ready to learn  Stage of change: ACTION (Actively working towards change)  Support resources: [employer]  Patient Activation Measure Survey Score:       No data to display                Care Plan and Education Provided:  Care Plan: Diabetes   Updates made by Denice Nickerson RD since 10/25/2023 12:00 AM        Problem: HbA1C Not In Goal         Goal: Establish Regular Follow-Ups with PCP         Task: Discuss with PCP the recommended timing for patient's next follow up visit(s)    Responsible User: Denice Nickerson RD        Task: Discuss schedule for PCP visits with patient    Responsible User: Denice Nickerson RD        Goal: Get HbA1C Level in Goal         Task: Educate patient on diabetes education self-management topics    Responsible User: Denice Nickerson RD        Task: Educate patient on benefits of regular glucose monitoring    Responsible User: Denice Nickerson RD        Task: Refer patient to appropriate extended care team member, as needed (Medication Therapy Management, Behavioral Health, Physical Therapy, etc.)    Responsible User: Denice Nickerson RD        Task: Discuss diabetes treatment plan with patient Completed 10/25/2023   Responsible User: Denice Nickerson RD        Problem: Diabetes Self-Management Education Needed to Optimize Self-Care " Behaviors         Goal: Understand diabetes pathophysiology and disease progression         Task: Provide education on diabetes pathophysiology and disease progression specfic to patient's diabetes type Completed 10/25/2023   Responsible User: Denice Nickerson RD        Goal: Healthy Eating - follow a healthy eating pattern for diabetes         Task: Provide education on portion control and consistency in amount, composition and timing of food intake    Responsible User: Denice Nickerson RD        Task: Provide education on managing carbohydrate intake (carbohydrate counting, plate planning method, etc.) Completed 10/25/2023   Responsible User: Denice Nickerson RD        Task: Provide education on weight management    Responsible User: Denice Nickerson RD        Task: Provide education on heart healthy eating    Responsible User: Denice Nickerson RD        Task: Provide education on eating out    Responsible User: Denice Nickerson RD        Task: Develop individualized healthy eating plan with patient    Responsible User: Denice Nickerson RD        Goal: Being Active - get regular physical activity, working up to at least 150 minutes per week         Task: Provide education on relationship of activity to glucose and precautions to take if at risk for low glucose    Responsible User: Denice Nickerson RD        Task: Discuss barriers to physical activity with patient    Responsible User: Denice Nickerson RD        Task: Develop physical activity plan with patient    Responsible User: Denice Nickerson RD        Task: Explore community resources including walking groups, assistance programs, and home videos    Responsible User: Denice Nickerson RD        Goal: Monitoring - monitor glucose and ketones as directed    Note:    Begin checking blood sugar 3+ times weekly       Task: Provide education on blood glucose monitoring (purpose, proper technique, frequency, glucose  targets, interpreting results, when to use glucose control solution, sharps disposal) Completed 10/25/2023   Responsible User: Denice Nickerson RD        Task: Provide education on continuous glucose monitoring (sensor placement, use of justine or /reader, understanding glucose trends, alerts and alarms, differences between sensor glucose and blood glucose)    Responsible User: Denice Nickerson RD        Task: Provide education on ketone monitoring (when to monitor, frequency, etc.)    Responsible User: Denice Nickerson RD        Goal: Taking Medication - patient is consistently taking medications as directed         Task: Provide education on action of prescribed medication, including when to take and possible side effects    Responsible User: Denice Nickerson RD        Task: Provide education on insulin and injectable diabetes medications, including administration, storage, site selection and rotation for injection sites    Responsible User: Denice Nickerson RD        Task: Discuss barriers to medication adherence with patient and provide management technique ideas as appropriate    Responsible User: Denice Nickerson RD        Task: Provide education on frequency and refill details of medications    Responsible User: Denice Nickerson RD        Goal: Problem Solving - know how to prevent and manage short-term diabetes complications         Task: Provide education on high blood glucose - causes, signs/symptoms, prevention and treatment    Responsible User: Denice Nickerson RD        Task: Provide education on low blood glucose - causes, signs/symptoms, prevention, treatment, carrying a carbohydrate source at all times, and medical identification    Responsible User: Denice Nickerson RD        Task: Provide education on safe travel with diabetes    Responsible User: Denice Nickerson RD        Task: Provide education on how to care for diabetes on sick days    Responsible  User: Denice Nickerson RD        Task: Provide education on when to call a health care provider    Responsible User: Denice Nickerson RD        Goal: Reducing Risks - know how to prevent and treat long-term diabetes complications         Task: Provide education on major complications of diabetes, prevention, early diagnostic measures and treatment of complications    Responsible User: Denice Nickerson RD        Task: Provide education on recommended care for dental, eye and foot health    Responsible User: Denice Nickerson RD        Task: Provide education on Hemoglobin A1c - goals and relationship to blood glucose levels    Responsible User: Denice Nickerson RD        Task: Provide education on recommendations for heart health - lipid levels and goals, blood pressure and goals, and aspirin therapy, if indicated    Responsible User: Denice Nickerson RD        Task: Provide education on tobacco cessation    Responsible User: Denice Nickerson RD        Goal: Healthy Coping - use available resources to cope with the challenges of managing diabetes         Task: Discuss recognizing feelings about having diabetes    Responsible User: Denice Nickerson RD        Task: Provide education on the benefits of making appropriate lifestyle changes    Responsible User: Denice Nickerson RD        Task: Provide education on benefits of utilizing support systems    Responsible User: Denice Nickerson RD        Task: Discuss methods for coping with stress    Responsible User: Denice Nickerson RD        Task: Provide education on when to seek professional counseling    Responsible User: Denice Nickerson RD Elizabeth Swartout RD, ELLIOT, [facility]  Diabetes Education      Time Spent: 30 minutes  Encounter Type: Individual    A copy of this encounter was shared with the provider.

## 2023-10-25 NOTE — PROGRESS NOTES
Diabetes Self-Management Education & Support    Presents for: Initial Assessment for new diagnosis  Type of service:  Video Visit  Originating Location (pt. Location): Home  Distant Location (provider location): Offsite  Mode of Communication:  Video Conference via [employer]  Joined the call at 10/25/2023, 1:05:16 pm.  Left the call at 10/25/2023, 1:41:02 pm.  You were on the call for 35 minutes 46 seconds   How would patient like to obtain AVS? MyChart    ASSESSMENT:  New diagnosis, caught early with A1c of 6.7.  Angel was not surprised; loves sweets and was already to quickly take these out.  2-3 regular sodas/day and cut out candy.  Suspect blood sugars improved markedly with this.  Reviewed carbohydrate counting, label reading, healthy eating in general etc.  Discussed a glucometer and blood sugar targets.  Discussed aggressive glycemic management and early intervention for halting disease process.   Angel has an excellent attitude/outlook and is motivated to make major changes to improve health, lose weight and has already been increasing activity.      Patient's most recent   Lab Results   Component Value Date    A1C 6.7 09/06/2023     is not meeting goal of <7.0  (aim for < 6.5 or < 6 in early Diabetes)     Diabetes knowledge and skills assessment:   Patient is knowledgeable in diabetes management concepts related to: Healthy Eating, Being Active, Monitoring, and Taking Medication  Continue education with the following diabetes management concepts: Problem Solving, Reducing Risks, and Healthy Coping  Based on learning assessment above, most appropriate setting for further diabetes education would be: Individual setting.      PLAN  Follow up scheduled 12/13  Glucometer to pharmacy   Continue with positive diet & lifestyle changes       Education Materials Provided:  [employer] Healthy Living with Diabetes Book    SUBJECTIVE/OBJECTIVE:  Presents for: Initial Assessment for new diagnosis  Accompanied  "by: Self  Diabetes education in the past 24mo: No  Focus of Visit: Monitoring, Reducing Risks, Taking Medication, Healthy Coping  Diabetes type: Type 2  Disease course: Improving  How confident are you filling out medical forms by yourself:: Extremely  Other concerns:: None  Cultural Influences/Ethnic Background:  Not  or     Diabetes Symptoms & Complications:          Patient Problem List and Family Medical History reviewed for relevant medical history, current medical status, and diabetes risk factors.    Vitals:  There were no vitals taken for this visit.  Estimated body mass index is 34.62 kg/m  as calculated from the following:    Height as of 9/6/23: 1.575 m (5' 2\").    Weight as of 9/6/23: 85.9 kg (189 lb 4.8 oz).   Last 3 BP:   BP Readings from Last 3 Encounters:   09/06/23 (!) 146/98   07/25/22 (!) 150/104       History   Smoking Status    Never   Smokeless Tobacco    Never       Labs:  Lab Results   Component Value Date    A1C 6.7 09/06/2023     Lab Results   Component Value Date     09/06/2023     Lab Results   Component Value Date     09/06/2023     Direct Measure HDL   Date Value Ref Range Status   09/06/2023 39 (L) >=40 mg/dL Final   ]  GFR Estimate   Date Value Ref Range Status   09/06/2023 63 >60 mL/min/1.73m2 Final     Comment:     The generation of the estimated GFR is currently based on binary male or female sex. If the electronic health record information indicates another gender identity or if Legal Sex is recorded as \"Unknown\", GFR estimates are not automatically calculated, and application of GFR equations or a direct GFR measurement should be considered according to the individual's appropriate clinical context.     No results found for: \"GFRESTBLACK\"  Lab Results   Component Value Date    CR 1.37 09/06/2023     No results found for: \"MICROALBUMIN\"    Healthy Eating:  Healthy Eating Assessed Today: Yes  Meal planning/habits: Avoiding sweets, Smaller " portions    Being Active:  Being Active Assessed Today: Yes  Exercise:: Yes    Monitoring:  Monitoring Assessed Today: Yes    Taking Medications:  Taking Medication Assessed Today: Yes    Problem Solving:  Not assessed at this visit     Reducing Risks:  Reducing Risks Assessed Today: Yes    Healthy Coping:  Healthy Coping Assessed Today: Yes  Emotional response to diabetes: Ready to learn  Stage of change: ACTION (Actively working towards change)  Support resources: [employer]  Patient Activation Measure Survey Score:       No data to display                Care Plan and Education Provided:  Care Plan: Diabetes   Updates made by Denice Nickerson RD since 10/25/2023 12:00 AM        Problem: HbA1C Not In Goal         Goal: Establish Regular Follow-Ups with PCP         Task: Discuss with PCP the recommended timing for patient's next follow up visit(s)    Responsible User: Denice Nickerson RD        Task: Discuss schedule for PCP visits with patient    Responsible User: Denice Nickerson RD        Goal: Get HbA1C Level in Goal         Task: Educate patient on diabetes education self-management topics    Responsible User: Denice Nickerson RD        Task: Educate patient on benefits of regular glucose monitoring    Responsible User: Denice Nickerson RD        Task: Refer patient to appropriate extended care team member, as needed (Medication Therapy Management, Behavioral Health, Physical Therapy, etc.)    Responsible User: Denice Nickerson RD        Task: Discuss diabetes treatment plan with patient Completed 10/25/2023   Responsible User: Denice Nickerson RD        Problem: Diabetes Self-Management Education Needed to Optimize Self-Care Behaviors         Goal: Understand diabetes pathophysiology and disease progression         Task: Provide education on diabetes pathophysiology and disease progression specfic to patient's diabetes type Completed 10/25/2023   Responsible User: Liya  MONROE Galdamez        Goal: Healthy Eating - follow a healthy eating pattern for diabetes         Task: Provide education on portion control and consistency in amount, composition and timing of food intake    Responsible User: Denice Nickerson RD        Task: Provide education on managing carbohydrate intake (carbohydrate counting, plate planning method, etc.) Completed 10/25/2023   Responsible User: Denice Nickerson RD        Task: Provide education on weight management    Responsible User: Denice Nickerson RD        Task: Provide education on heart healthy eating    Responsible User: Denice Nickerson RD        Task: Provide education on eating out    Responsible User: Denice Nickerson RD        Task: Develop individualized healthy eating plan with patient    Responsible User: Denice Nickerson RD        Goal: Being Active - get regular physical activity, working up to at least 150 minutes per week         Task: Provide education on relationship of activity to glucose and precautions to take if at risk for low glucose    Responsible User: Denice Nickerson RD        Task: Discuss barriers to physical activity with patient    Responsible User: Denice Nickerson RD        Task: Develop physical activity plan with patient    Responsible User: Denice Nickerson RD        Task: Explore community resources including walking groups, assistance programs, and home videos    Responsible User: Denice Nickerson RD        Goal: Monitoring - monitor glucose and ketones as directed    Note:    Begin checking blood sugar 3+ times weekly       Task: Provide education on blood glucose monitoring (purpose, proper technique, frequency, glucose targets, interpreting results, when to use glucose control solution, sharps disposal) Completed 10/25/2023   Responsible User: Denice Nickerson RD        Task: Provide education on continuous glucose monitoring (sensor placement, use of justine or  /reader, understanding glucose trends, alerts and alarms, differences between sensor glucose and blood glucose)    Responsible User: Denice Nickerson RD        Task: Provide education on ketone monitoring (when to monitor, frequency, etc.)    Responsible User: Denice Nickerson RD        Goal: Taking Medication - patient is consistently taking medications as directed         Task: Provide education on action of prescribed medication, including when to take and possible side effects    Responsible User: Denice Nickerson RD        Task: Provide education on insulin and injectable diabetes medications, including administration, storage, site selection and rotation for injection sites    Responsible User: Denice Nickerson RD        Task: Discuss barriers to medication adherence with patient and provide management technique ideas as appropriate    Responsible User: Denice Nickerson RD        Task: Provide education on frequency and refill details of medications    Responsible User: Denice Nickerson RD        Goal: Problem Solving - know how to prevent and manage short-term diabetes complications         Task: Provide education on high blood glucose - causes, signs/symptoms, prevention and treatment    Responsible User: Denice Nickerson RD        Task: Provide education on low blood glucose - causes, signs/symptoms, prevention, treatment, carrying a carbohydrate source at all times, and medical identification    Responsible User: Denice Nickerson RD        Task: Provide education on safe travel with diabetes    Responsible User: Denice Nickerson RD        Task: Provide education on how to care for diabetes on sick days    Responsible User: Denice Nickerson RD        Task: Provide education on when to call a health care provider    Responsible User: Denice Nickerson RD        Goal: Reducing Risks - know how to prevent and treat long-term diabetes complications          Task: Provide education on major complications of diabetes, prevention, early diagnostic measures and treatment of complications    Responsible User: Denice Nickerson RD        Task: Provide education on recommended care for dental, eye and foot health    Responsible User: Denice Nickerson RD        Task: Provide education on Hemoglobin A1c - goals and relationship to blood glucose levels    Responsible User: Denice Nickerson RD        Task: Provide education on recommendations for heart health - lipid levels and goals, blood pressure and goals, and aspirin therapy, if indicated    Responsible User: Denice Nickerson RD        Task: Provide education on tobacco cessation    Responsible User: Denice Nickerson RD        Goal: Healthy Coping - use available resources to cope with the challenges of managing diabetes         Task: Discuss recognizing feelings about having diabetes    Responsible User: Denice Nickerson RD        Task: Provide education on the benefits of making appropriate lifestyle changes    Responsible User: Denice Nickerson RD        Task: Provide education on benefits of utilizing support systems    Responsible User: Denice Nickerson RD        Task: Discuss methods for coping with stress    Responsible User: Denice Nickerson RD        Task: Provide education on when to seek professional counseling    Responsible User: Denice Nickerson RD Elizabeth Swartout RD, ELLIOT, [facility]  Diabetes Education      Time Spent: 30 minutes  Encounter Type: Individual    A copy of this encounter was shared with the provider.

## 2023-11-07 ENCOUNTER — ANCILLARY PROCEDURE (OUTPATIENT)
Dept: GENERAL RADIOLOGY | Facility: CLINIC | Age: 48
End: 2023-11-07
Attending: PHYSICIAN ASSISTANT
Payer: COMMERCIAL

## 2023-11-07 ENCOUNTER — OFFICE VISIT (OUTPATIENT)
Dept: FAMILY MEDICINE | Facility: CLINIC | Age: 48
End: 2023-11-07
Payer: COMMERCIAL

## 2023-11-07 VITALS
TEMPERATURE: 98 F | HEART RATE: 94 BPM | SYSTOLIC BLOOD PRESSURE: 124 MMHG | RESPIRATION RATE: 18 BRPM | OXYGEN SATURATION: 92 % | DIASTOLIC BLOOD PRESSURE: 86 MMHG

## 2023-11-07 DIAGNOSIS — M25.572 PAIN IN JOINT, ANKLE AND FOOT, LEFT: Primary | ICD-10-CM

## 2023-11-07 DIAGNOSIS — M25.572 PAIN IN JOINT, ANKLE AND FOOT, LEFT: ICD-10-CM

## 2023-11-07 PROCEDURE — 99214 OFFICE O/P EST MOD 30 MIN: CPT | Performed by: PHYSICIAN ASSISTANT

## 2023-11-07 PROCEDURE — 73610 X-RAY EXAM OF ANKLE: CPT | Mod: TC | Performed by: RADIOLOGY

## 2023-11-07 ASSESSMENT — PATIENT HEALTH QUESTIONNAIRE - PHQ9: SUM OF ALL RESPONSES TO PHQ QUESTIONS 1-9: 15

## 2023-11-07 ASSESSMENT — PAIN SCALES - GENERAL: PAINLEVEL: EXTREME PAIN (8)

## 2023-11-07 NOTE — RESULT ENCOUNTER NOTE
Bret Ortiz,     Happy to report that your left ankle x-ray was negative for fracture.  Small bone spur noted at the base of your heel.  Stick to the plan from today.  If no improvement would recommend you follow-up with a foot/ankle specialist.    Let me know if you have any questions or concerns,     Eric Porter PA-C  [facility]

## 2023-11-07 NOTE — PROGRESS NOTES
"Assessment & Plan     Pain in joint, ankle and foot, left  Given inability to bear weight concern for underlying fracture.  Left ankle x-ray today. Based on my read no obvious fracture - crutch trained, compression wrap, ankle air stirup applied.  Comfortable going home.  Leg elevation, ice, ibuprofen/Tylenol alternating.  I will contact him with the radiology read if anything changes.  Follow-up with orthopedics if no improvement or worsening of symptoms.  Comfortable with plan.  - XR Ankle Left G/E 3 Views    30 minutes spent by me on the date of the encounter on chart review, review of test results, interpretation of tests, patient visit, and documentation       BMI:   Estimated body mass index is 34.62 kg/m  as calculated from the following:    Height as of 9/6/23: 1.575 m (5' 2\").    Weight as of 9/6/23: 85.9 kg (189 lb 4.8 oz).   Weight management plan: Discussed healthy diet and exercise guidelines    Depression Screening Follow Up        11/7/2023     7:32 AM   PHQ   PHQ-9 Total Score 15   Q9: Thoughts of better off dead/self-harm past 2 weeks Nearly every day     Follow Up Actions Taken  Patient to follow up with PCP.  Clinic staff to schedule appointment if able.    Discussed ways the patient can remain in a safe environment:    No follow-ups on file.    The likelihood of other entities in the differential is insufficient to justify any further testing for them at this time. This was explained to the patient. The patient was advised that persistent or worsening symptoms would require further evaluation. Patient advised to call the office and if unable to reach to go to the emergency room if they develop any new or worsening symptoms. Expressed understanding and agreement with above stated plan.     DONG Watkins [facility]   Angel REYNA Fani is a 48 year old male presenting for the following health issues:  Patient presents with:  Ankle Problem    Injured left " ankle while walking their dog on 11/3/2023.  Notes inversion mechanism over a manhole cover.  Has been elevating, icing, and using ibuprofen which is all not been helpful.  Notes continued issues with bearing weight.  Pain located over the left lateral ankle aspect.  No numbness/tingling.  No prior injuries or surgeries to affected extremity.  Best friend drove into the clinic today.    Review of Systems   Constitutional, HEENT, cardiovascular, pulmonary, GI, , musculoskeletal, neuro, skin, endocrine and psych systems are negative, except as otherwise noted.      Objective    /86 (BP Location: Right arm, Patient Position: Sitting, Cuff Size: Adult Large)   Pulse 94   Temp 98  F (36.7  C) (Oral)   Resp 18   SpO2 92%   Data Unavailable  0 lbs 0 oz    Physical Exam   GENERAL: healthy, alert and no distress  EYES: Eyes grossly normal to inspection, PERRL and conjunctivae and sclerae normal  NECK: no adenopathy, no asymmetry, masses, or scars and thyroid normal to palpation  RESP: lungs clear to auscultation - no rales, rhonchi or wheezes  CV: regular rate and rhythm, normal S1 S2, no S3 or S4, no murmur, click or rub  MS: no gross musculoskeletal defects noted, mild edema left ankle region.  Minimal discomfort with palpation over the left lateral malleolus or majority of pain is endorsed.  Negative talar tilt.  Pain reproducible with ankle inversion.  Sensation intact.  Strong distal pulses.  SKIN: no suspicious lesions or rashes  NEURO: Normal strength and tone, mentation intact and speech normal  PSYCH: mentation appears normal, affect normal/bright

## 2023-11-07 NOTE — PATIENT INSTRUCTIONS
Leg elevation, ice (10 on and 10 off), compression wraps.   Alternate ibuprofen and tylenol    [facility]  [address], [place] [address] [phone]

## 2023-11-09 ENCOUNTER — TELEPHONE (OUTPATIENT)
Dept: FAMILY MEDICINE | Facility: CLINIC | Age: 48
End: 2023-11-09
Payer: COMMERCIAL

## 2023-11-09 ENCOUNTER — MYC MEDICAL ADVICE (OUTPATIENT)
Dept: FAMILY MEDICINE | Facility: CLINIC | Age: 48
End: 2023-11-09
Payer: COMMERCIAL

## 2023-11-09 NOTE — TELEPHONE ENCOUNTER
Left voice message asking pt to call triage back. On call back, please triage symptoms- ankle problem and depression.    Pt had OV with JENNIFER Porter 11/07/2023. See visit plan below. Is pt still having ankle pain or has scheduled appt with [facility]? Does he want to wait to discuss concerns with PCP at appt scheduled 11/15/2023 or needs earlier visit?       Assessment & Plan   Pain in joint, ankle and foot, left  Given inability to bear weight concern for underlying fracture.  Left ankle x-ray today. Based on my read no obvious fracture - crutch trained, compression wrap, ankle air stirup applied.  Comfortable going home.  Leg elevation, ice, ibuprofen/Tylenol alternating.  I will contact him with the radiology read if anything changes.  Follow-up with orthopedics if no improvement or worsening of symptoms.  Comfortable with plan.  - XR Ankle Left G/E 3 Views     Leg elevation, ice (10 on and 10 off), compression wraps.   Alternate ibuprofen and tylenol     [facility]  [address] [place] [id number]  [phone]

## 2023-11-13 NOTE — TELEPHONE ENCOUNTER
Triage outreach    Attempt number 2    Left message to call back at [phone].    Emma RN  [facility]

## 2023-11-14 NOTE — TELEPHONE ENCOUNTER
Patient Contact    Attempt # 3    Was call answered?  No.  Left message on voicemail with information to call back.    Alem DWYER, Triage RN  [facility]

## 2023-11-14 NOTE — PROGRESS NOTES
Subjective   Angel is a 48 year old, presenting for the following health issues:  No chief complaint on file.    History of Present Illness       Diabetes:   Angel Mohr presents for follow up of diabetes.  Angel Mohr is checking home blood glucose a few times a week.   Angel Mohr checks blood glucose before and after meals.  Blood glucose is never over 200 and never under 70. Angel Mohr is aware of hypoglycemia symptoms including shakiness, dizziness, weakness and lethargy.    Angel Mohr has no concerns regarding Angel Mohr's diabetes at this time.  Angel Mohr is having numbness in feet.  The patient has not had a diabetic eye exam in the last 12 months.          Hyperlipidemia:  Angel Mohr presents for follow up of hyperlipidemia.   Angel Mohr is taking medication to lower cholesterol. Angel Mohr is not having myalgia or other side effects to statin medications.    Hypertension: Angel Mohr presents for follow up of hypertension.  Angel Mohr does not check blood pressure  regularly outside of the clinic. Outside blood pressures have been over 140/90. Angel Mohr does not follow a low salt diet.     Angel Mohr eats 0-1 servings of fruits and vegetables daily.Angel Mhor consumes 0 sweetened beverage(s) daily.Angel Mohr exercises with enough effort to increase Angel Mckeons heart rate 10 to 19 minutes per day.  Angel Mohr exercises with enough effort to increase Angel Mckeons heart rate 4 days per week.   Angel Mohr is taking medications regularly.       Ankle Sprain   Angel Mohr was seen on 11/07 for acute ankle sprain suffered in the same morning.  He was pulled by his dogs unexpectedly and tripped over the leash and injured his ankle.  He was evaluated with X-ray that was negative for fracture and has been using ankle brace which has been effective.  Using ice regularly and keeping leg elevated.  He has been working from home and has been able to keep  "full time scheduled broken up with occasional rest and elevation of the ankle   Diabetes mellitus   Has been checking blood glucose most mornings and finding readings in the low 100's.  Not taking any oral medications.  No retinopathy, no neuropathy.  He has been moderating carbohydrates and has spoken to diabetic education.   Has been working       Review of Systems   Constitutional, HEENT, cardiovascular, pulmonary, GI, , musculoskeletal, neuro, skin, endocrine and psych systems are negative, except as otherwise noted.      Objective    /83 (BP Location: Right arm, Patient Position: Sitting, Cuff Size: Adult Large)   Pulse 75   Temp 98.9  F (37.2  C) (Temporal)   Resp 16   Ht 1.6 m (5' 3\")   Wt 87.8 kg (193 lb 9.6 oz)   SpO2 94%   BMI 34.29 kg/m    Body mass index is 34.29 kg/m .  Physical Exam   GENERAL: healthy, alert and no distress  EYES: Eyes grossly normal to inspection,    NECK: no adenopathy, no asymmetry   RESP: lungs clear to auscultation - no rales, rhonchi or wheezes  CV: regular rate and rhythm,   ABDOMEN: soft, nontender, no hepatosplenomegaly, no masses and bowel sounds normal  MS: no gross musculoskeletal defects noted, no edema + wearing ankle brace on left ankle  SKIN: no suspicious lesions or rashes  NEURO: Normal strength and tone, mentation intact and speech normal  PSYCH: mentation appears normal, affect normal/bright      Patient Instructions   (E11.9) Type 2 diabetes mellitus without complication, without long-term current use of insulin (H)  (primary encounter diagnosis)  Comment: We will check labs today and follow up in 3 months and continue to work on diet and exercise  Plan: Albumin Random Urine Quantitative with Creat         Ratio, Hemoglobin A1c, Lipid panel reflex to         direct LDL Fasting, **Hepatic panel FUTURE 2mo            (Z23) Need for prophylactic vaccination and inoculation against influenza  Comment: Flu shot and COVID shot today  Plan:             20 " minutes spent with patient.  Over 50% of time counseling

## 2023-11-15 ENCOUNTER — OFFICE VISIT (OUTPATIENT)
Dept: FAMILY MEDICINE | Facility: CLINIC | Age: 48
End: 2023-11-15
Payer: COMMERCIAL

## 2023-11-15 VITALS
SYSTOLIC BLOOD PRESSURE: 124 MMHG | HEART RATE: 75 BPM | DIASTOLIC BLOOD PRESSURE: 83 MMHG | HEIGHT: 63 IN | WEIGHT: 193.6 LBS | BODY MASS INDEX: 34.3 KG/M2 | OXYGEN SATURATION: 94 % | RESPIRATION RATE: 16 BRPM | TEMPERATURE: 98.9 F

## 2023-11-15 DIAGNOSIS — Z23 NEED FOR PROPHYLACTIC VACCINATION AND INOCULATION AGAINST INFLUENZA: ICD-10-CM

## 2023-11-15 DIAGNOSIS — E11.9 TYPE 2 DIABETES MELLITUS WITHOUT COMPLICATION, WITHOUT LONG-TERM CURRENT USE OF INSULIN (H): Primary | ICD-10-CM

## 2023-11-15 LAB
CREAT UR-MCNC: 216 MG/DL
HBA1C MFR BLD: 6.6 % (ref 0–5.6)
MICROALBUMIN UR-MCNC: 123 MG/L
MICROALBUMIN/CREAT UR: 56.94 MG/G CR (ref 0–25)

## 2023-11-15 PROCEDURE — 36415 COLL VENOUS BLD VENIPUNCTURE: CPT | Performed by: INTERNAL MEDICINE

## 2023-11-15 PROCEDURE — 82570 ASSAY OF URINE CREATININE: CPT | Performed by: INTERNAL MEDICINE

## 2023-11-15 PROCEDURE — 90682 RIV4 VACC RECOMBINANT DNA IM: CPT | Performed by: INTERNAL MEDICINE

## 2023-11-15 PROCEDURE — 90471 IMMUNIZATION ADMIN: CPT | Performed by: INTERNAL MEDICINE

## 2023-11-15 PROCEDURE — 99213 OFFICE O/P EST LOW 20 MIN: CPT | Mod: 25 | Performed by: INTERNAL MEDICINE

## 2023-11-15 PROCEDURE — 90480 ADMN SARSCOV2 VAC 1/ONLY CMP: CPT | Performed by: INTERNAL MEDICINE

## 2023-11-15 PROCEDURE — 82043 UR ALBUMIN QUANTITATIVE: CPT | Performed by: INTERNAL MEDICINE

## 2023-11-15 PROCEDURE — 91320 SARSCV2 VAC 30MCG TRS-SUC IM: CPT | Performed by: INTERNAL MEDICINE

## 2023-11-15 PROCEDURE — 83036 HEMOGLOBIN GLYCOSYLATED A1C: CPT | Performed by: INTERNAL MEDICINE

## 2023-11-15 ASSESSMENT — PAIN SCALES - GENERAL: PAINLEVEL: MILD PAIN (2)

## 2023-11-15 NOTE — TELEPHONE ENCOUNTER
Pt had appointment with Dr. Marinelli on 11/15/23.    FMLA forms completed and faxed,  Copy sent to [facility] and placed in [employer] accordion file

## 2023-11-15 NOTE — Clinical Note
Please abstract the following data from this visit with this patient into the appropriate field in Epic:  Tests that can be patient reported without a hard copy:  Eye exam with ophthalmology on this date: 04/07/2023 Exam Location: [facility]  [facility]   Other Tests found in the patient's chart through Chart Review/Care Everywhere:  {Abstract Quality List (Optional):703597}  Note to Abstraction: If this section is blank, no results were found via Chart Review/Care Everywhere.

## 2023-11-15 NOTE — PATIENT INSTRUCTIONS
(E11.9) Type 2 diabetes mellitus without complication, without long-term current use of insulin (H)  (primary encounter diagnosis)  Comment: We will check labs today and follow up in 3 months and continue to work on diet and exercise  Plan: Albumin Random Urine Quantitative with Creat         Ratio, Hemoglobin A1c, Lipid panel reflex to         direct LDL Fasting, **Hepatic panel FUTURE 2mo            (Z23) Need for prophylactic vaccination and inoculation against influenza  Comment: Flu shot and COVID shot today  Plan:

## 2023-11-16 ENCOUNTER — TELEPHONE (OUTPATIENT)
Dept: FAMILY MEDICINE | Facility: CLINIC | Age: 48
End: 2023-11-16
Payer: COMMERCIAL

## 2023-11-16 DIAGNOSIS — I10 ESSENTIAL HYPERTENSION: Primary | ICD-10-CM

## 2023-11-16 NOTE — TELEPHONE ENCOUNTER
Can we call Angel Mohr and let him know that       Bret Ortiz,    I have had the opportunity to review your recent results and an interpretation is as follows:  Your hemoglobin A1c is slightly improved without medications.  This is a very good result  Your urine albumin is a bit elevated, and we should consider adjusting your blood pressure medications to start an ACE inhibitor to replace amlodipine.  I would recommend starting lisinopril 5 mg and stopping amlodipine, and we could recheck your renal function in another 2 to 4 weeks    Sincerely,  Chintan Marinelli MD

## 2023-11-16 NOTE — RESULT ENCOUNTER NOTE
Bret Ortiz,    I have had the opportunity to review your recent results and an interpretation is as follows:  Your hemoglobin A1c is slightly improved without medications.  This is a very good result  Your urine albumin is a bit elevated, and we should consider adjusting your blood pressure medications to start an ACE inhibitor to replace amlodipine.  I would recommend starting lisinopril 5 mg and stopping amlodipine, and we could recheck your renal function in another 2 to 4 weeks    Sincerely,  Chintan Marinelli MD

## 2023-11-17 NOTE — TELEPHONE ENCOUNTER
Patient Contact    Attempt # 1    Was call answered? No.    Left message for patient to call triage back.    Left  message as well.    Sonia Khan RN

## 2023-11-20 NOTE — TELEPHONE ENCOUNTER
Pt read Ingenyhart but did not respond     Sent pt another message following up/requesting a response     Alem HORTON, Triage RN  [facility]

## 2023-11-21 RX ORDER — LISINOPRIL 5 MG/1
5 TABLET ORAL DAILY
Qty: 90 TABLET | Refills: 3 | Status: CANCELLED | OUTPATIENT
Start: 2023-11-21

## 2023-11-21 NOTE — TELEPHONE ENCOUNTER
Please see updated mychart encounter:        Patient agreeable to rx - pended pharmacy - routing back to PCP to sign rx - thank you!     Scar Land RN  [facility]

## 2023-12-13 ENCOUNTER — VIRTUAL VISIT (OUTPATIENT)
Dept: EDUCATION SERVICES | Facility: CLINIC | Age: 48
End: 2023-12-13
Payer: COMMERCIAL

## 2023-12-13 DIAGNOSIS — E11.9 TYPE 2 DIABETES MELLITUS WITHOUT COMPLICATION, WITHOUT LONG-TERM CURRENT USE OF INSULIN (H): Primary | ICD-10-CM

## 2023-12-13 PROCEDURE — G0108 DIAB MANAGE TRN  PER INDIV: HCPCS | Mod: VID | Performed by: DIETITIAN, REGISTERED

## 2023-12-13 RX ORDER — BLOOD-GLUCOSE SENSOR
1 EACH MISCELLANEOUS
Qty: 1 EACH | Refills: 0 | Status: SHIPPED | OUTPATIENT
Start: 2023-12-13 | End: 2024-08-09

## 2023-12-13 RX ORDER — BLOOD-GLUCOSE SENSOR
1 EACH MISCELLANEOUS
Qty: 2 EACH | Refills: 11 | Status: SHIPPED | OUTPATIENT
Start: 2023-12-13 | End: 2024-08-09

## 2023-12-13 NOTE — LETTER
12/13/2023         RE: Angel Mohr  [address] [place] [address] [address]  [facility] [id number]        Dear Colleague,    Thank you for referring your patient, Angel Mohr, to the [facility] DIABETES EDUCATION. Please see a copy of my visit note below.    Diabetes Self-Management Education & Support  Presents for: Individual review  Type of service:  Video Visit  Originating Location (pt. Location): Home  Distant Location (provider location): Offsite  Mode of Communication:  Video Conference via Brevado  Joined the call at 12/13/2023, 7:58:17 am.  Left the call at 12/13/2023, 8:39:35 am.  You were on the call for 41 minutes 18 seconds  How would patient like to obtain AVS? MyChart    ASSESSMENT:  Second visit with Diabetes Ed; A1c improved via diet/lifestyle and no medications.  Checking blood sugars and reviewed targets.    Blood sugar went down to 105 Tuesday 11/14 - afternoon.  Really proactive on food that day   Checked yesterday 128 before lunch      Diet / food notes   has been very proactive and helpful with healthy cooking   Watching the sugar, carbohydrates and salt     Activity   Walking the dogs    More walks   Has a gym where they live and has been biking, elliptical, treadmill  - 45 mins of cardio + lifting weights    Reviewed how continuous glucose monitoring can be a useful technology and Angel is very interested in this even if cash pay.  Sent detailed message on this and prescriptions to pharmacy.       Patient's most recent   Lab Results   Component Value Date    A1C 6.6 11/15/2023     is meeting goal of <7.0    Diabetes knowledge and skills assessment:   Patient is knowledgeable in diabetes management concepts related to: Healthy Eating, Being Active, Monitoring, Taking Medication, Problem Solving, Reducing Risks, and Healthy Coping  Continue education with the following diabetes management concepts: continuous glucose monitor review  Based on learning assessment above,  "most appropriate setting for further diabetes education would be: Individual setting.      PLAN  Continue with positive diet & lifestyle changes   FreeStyle Juancarlos prescription and information sent  Follow up on mychart after wearing the Juancarlos 3 free trial    SUBJECTIVE/OBJECTIVE:  Presents for: Individual review  Accompanied by: Self  Diabetes education in the past 24mo: Yes  Focus of Visit: Monitoring, Reducing Risks, Taking Medication, Healthy Coping, CGM  Diabetes type: Type 2  Disease course: Improving  How confident are you filling out medical forms by yourself:: Extremely  Other concerns:: None  Cultural Influences/Ethnic Background:  Not  or     Diabetes Symptoms & Complications:          Patient Problem List and Family Medical History reviewed for relevant medical history, current medical status, and diabetes risk factors.    Vitals:  There were no vitals taken for this visit.  Estimated body mass index is 34.29 kg/m  as calculated from the following:    Height as of 11/15/23: 1.6 m (5' 3\").    Weight as of 11/15/23: 87.8 kg (193 lb 9.6 oz).   Last 3 BP:   BP Readings from Last 3 Encounters:   11/15/23 124/83   11/07/23 124/86   09/06/23 (!) 146/98       History   Smoking Status     Never   Smokeless Tobacco     Never       Labs:  Lab Results   Component Value Date    A1C 6.6 11/15/2023     Lab Results   Component Value Date     09/06/2023     Lab Results   Component Value Date     09/06/2023     Direct Measure HDL   Date Value Ref Range Status   09/06/2023 39 (L) >=40 mg/dL Final   ]  GFR Estimate   Date Value Ref Range Status   09/06/2023 63 >60 mL/min/1.73m2 Final     Comment:     The generation of the estimated GFR is currently based on binary male or female sex. If the electronic health record information indicates another gender identity or if Legal Sex is recorded as \"Unknown\", GFR estimates are not automatically calculated, and application of GFR equations or a direct GFR " "measurement should be considered according to the individual's appropriate clinical context.     No results found for: \"GFRESTBLACK\"  Lab Results   Component Value Date    CR 1.37 09/06/2023     No results found for: \"MICROALBUMIN\"    Healthy Eating:  Healthy Eating Assessed Today: Yes  Meal planning/habits: Avoiding sweets, Smaller portions    Being Active:  Being Active Assessed Today: Yes  Exercise:: Yes    Monitoring:  Monitoring Assessed Today: Yes    Taking Medications:    Taking Medication Assessed Today: Yes    Problem Solving:  Problem Solving Assessed Today: Yes  Is the patient at risk for hypoglycemia?: No              Reducing Risks:  Reducing Risks Assessed Today: Yes  Diabetes Risks: Age over 45 years    Healthy Coping:  Healthy Coping Assessed Today: Yes  Emotional response to diabetes: Ready to learn  Stage of change: ACTION (Actively working towards change)  Support resources: Websites  Patient Activation Measure Survey Score:       No data to display              Care Plan and Education Provided:  Care Plan: Diabetes   Updates made by Denice Nickerson RD since 12/13/2023 12:00 AM        Problem: HbA1C Not In Goal         Goal: Establish Regular Follow-Ups with PCP         Task: Discuss schedule for PCP visits with patient Completed 12/13/2023   Responsible User: Denice Nickerson RD        Goal: Get HbA1C Level in Goal         Task: Educate patient on diabetes education self-management topics Completed 12/13/2023   Responsible User: Denice Nickerson RD        Task: Educate patient on benefits of regular glucose monitoring Completed 12/13/2023   Responsible User: Denice Nickerson RD        Problem: Diabetes Self-Management Education Needed to Optimize Self-Care Behaviors         Goal: Being Active - get regular physical activity, working up to at least 150 minutes per week         Task: Provide education on relationship of activity to glucose and precautions to take if at risk for low " glucose Completed 12/13/2023   Responsible User: Denice Nickerson RD        Goal: Monitoring - monitor glucose and ketones as directed    This Visit's Progress: 100%   Note:    Begin checking blood sugar 3+ times weekly       Task: Provide education on continuous glucose monitoring (sensor placement, use of justine or /reader, understanding glucose trends, alerts and alarms, differences between sensor glucose and blood glucose) Completed 12/13/2023   Responsible User: Denice Nickerson RD        Goal: Problem Solving - know how to prevent and manage short-term diabetes complications         Task: Provide education on how to care for diabetes on sick days Completed 12/13/2023   Responsible User: Denice Nickerson RD        Task: Provide education on when to call a health care provider Completed 12/13/2023   Responsible User: Denice Nickerson RD        Goal: Reducing Risks - know how to prevent and treat long-term diabetes complications         Task: Provide education on major complications of diabetes, prevention, early diagnostic measures and treatment of complications Completed 12/13/2023   Responsible User: Denice Nickerson RD        Task: Provide education on Hemoglobin A1c - goals and relationship to blood glucose levels Completed 12/13/2023   Responsible User: Denice Nickerson RD        Goal: Healthy Coping - use available resources to cope with the challenges of managing diabetes         Task: Discuss recognizing feelings about having diabetes Completed 12/13/2023   Responsible User: Denice Nickerson RD        Task: Provide education on the benefits of making appropriate lifestyle changes Completed 12/13/2023   Responsible User: Denice Nickerson RD        Task: Provide education on benefits of utilizing support systems Completed 12/13/2023   Responsible User: Denice Nickerson RD Elizabeth Swartout RD, ELLIOT, [facility]  Diabetes Education      Time Spent: 41  minutes  Encounter Type: Individual    A copy of this encounter was shared with the provider.   .

## 2023-12-13 NOTE — PROGRESS NOTES
Diabetes Self-Management Education & Support  Presents for: Individual review  Type of service:  Video Visit  Originating Location (pt. Location): Home  Distant Location (provider location): Offsite  Mode of Communication:  Video Conference via [employer]  Joined the call at 12/13/2023, 7:58:17 am.  Left the call at 12/13/2023, 8:39:35 am.  You were on the call for 41 minutes 18 seconds  How would patient like to obtain AVS? MyChart    ASSESSMENT:  Second visit with Diabetes Ed; A1c improved via diet/lifestyle and no medications.  Checking blood sugars and reviewed targets.    Blood sugar went down to 105 Tuesday 11/14 - afternoon.  Really proactive on food that day   Checked yesterday 128 before lunch      Diet / food notes   has been very proactive and helpful with healthy cooking   Watching the sugar, carbohydrates and salt     Activity   Walking the dogs    More walks   Has a gym where they live and has been biking, elliptical, treadmill  - 45 mins of cardio + lifting weights    Reviewed how continuous glucose monitoring can be a useful technology and Angel is very interested in this even if cash pay.  Sent detailed message on this and prescriptions to pharmacy.       Patient's most recent   Lab Results   Component Value Date    A1C 6.6 11/15/2023     is meeting goal of <7.0    Diabetes knowledge and skills assessment:   Patient is knowledgeable in diabetes management concepts related to: Healthy Eating, Being Active, Monitoring, Taking Medication, Problem Solving, Reducing Risks, and Healthy Coping  Continue education with the following diabetes management concepts: continuous glucose monitor review  Based on learning assessment above, most appropriate setting for further diabetes education would be: Individual setting.      PLAN  Continue with positive diet & lifestyle changes   FreeStyle Juancarlos prescription and information sent  Follow up on [employer] after wearing the Juancarlos 3 free  "trial    SUBJECTIVE/OBJECTIVE:  Presents for: Individual review  Accompanied by: Self  Diabetes education in the past 24mo: Yes  Focus of Visit: Monitoring, Reducing Risks, Taking Medication, Healthy Coping, CGM  Diabetes type: Type 2  Disease course: Improving  How confident are you filling out medical forms by yourself:: Extremely  Other concerns:: None  Cultural Influences/Ethnic Background:  Not  or     Diabetes Symptoms & Complications:          Patient Problem List and Family Medical History reviewed for relevant medical history, current medical status, and diabetes risk factors.    Vitals:  There were no vitals taken for this visit.  Estimated body mass index is 34.29 kg/m  as calculated from the following:    Height as of 11/15/23: 1.6 m (5' 3\").    Weight as of 11/15/23: 87.8 kg (193 lb 9.6 oz).   Last 3 BP:   BP Readings from Last 3 Encounters:   11/15/23 124/83   11/07/23 124/86   09/06/23 (!) 146/98       History   Smoking Status    Never   Smokeless Tobacco    Never       Labs:  Lab Results   Component Value Date    A1C 6.6 11/15/2023     Lab Results   Component Value Date     09/06/2023     Lab Results   Component Value Date     09/06/2023     Direct Measure HDL   Date Value Ref Range Status   09/06/2023 39 (L) >=40 mg/dL Final   ]  GFR Estimate   Date Value Ref Range Status   09/06/2023 63 >60 mL/min/1.73m2 Final     Comment:     The generation of the estimated GFR is currently based on binary male or female sex. If the electronic health record information indicates another gender identity or if Legal Sex is recorded as \"Unknown\", GFR estimates are not automatically calculated, and application of GFR equations or a direct GFR measurement should be considered according to the individual's appropriate clinical context.     No results found for: \"GFRESTBLACK\"  Lab Results   Component Value Date    CR 1.37 09/06/2023     No results found for: \"MICROALBUMIN\"    Healthy " Eating:  Healthy Eating Assessed Today: Yes  Meal planning/habits: Avoiding sweets, Smaller portions    Being Active:  Being Active Assessed Today: Yes  Exercise:: Yes    Monitoring:  Monitoring Assessed Today: Yes    Taking Medications:    Taking Medication Assessed Today: Yes    Problem Solving:  Problem Solving Assessed Today: Yes  Is the patient at risk for hypoglycemia?: No              Reducing Risks:  Reducing Risks Assessed Today: Yes  Diabetes Risks: Age over 45 years    Healthy Coping:  Healthy Coping Assessed Today: Yes  Emotional response to diabetes: Ready to learn  Stage of change: ACTION (Actively working towards change)  Support resources: Websites  Patient Activation Measure Survey Score:       No data to display              Care Plan and Education Provided:  Care Plan: Diabetes   Updates made by Denice Nickerson RD since 12/13/2023 12:00 AM        Problem: HbA1C Not In Goal         Goal: Establish Regular Follow-Ups with PCP         Task: Discuss schedule for PCP visits with patient Completed 12/13/2023   Responsible User: Denice Nickerson RD        Goal: Get HbA1C Level in Goal         Task: Educate patient on diabetes education self-management topics Completed 12/13/2023   Responsible User: Denice Nickerson RD        Task: Educate patient on benefits of regular glucose monitoring Completed 12/13/2023   Responsible User: Denice Nickerson RD        Problem: Diabetes Self-Management Education Needed to Optimize Self-Care Behaviors         Goal: Being Active - get regular physical activity, working up to at least 150 minutes per week         Task: Provide education on relationship of activity to glucose and precautions to take if at risk for low glucose Completed 12/13/2023   Responsible User: Denice Nickerson RD        Goal: Monitoring - monitor glucose and ketones as directed    This Visit's Progress: 100%   Note:    Begin checking blood sugar 3+ times weekly       Task:  Provide education on continuous glucose monitoring (sensor placement, use of justine or /reader, understanding glucose trends, alerts and alarms, differences between sensor glucose and blood glucose) Completed 12/13/2023   Responsible User: Denice Nickerson RD        Goal: Problem Solving - know how to prevent and manage short-term diabetes complications         Task: Provide education on how to care for diabetes on sick days Completed 12/13/2023   Responsible User: Denice Nickerson RD        Task: Provide education on when to call a health care provider Completed 12/13/2023   Responsible User: Denice Nickerson RD        Goal: Reducing Risks - know how to prevent and treat long-term diabetes complications         Task: Provide education on major complications of diabetes, prevention, early diagnostic measures and treatment of complications Completed 12/13/2023   Responsible User: Denice Nickerson RD        Task: Provide education on Hemoglobin A1c - goals and relationship to blood glucose levels Completed 12/13/2023   Responsible User: Denice Nickerson RD        Goal: Healthy Coping - use available resources to cope with the challenges of managing diabetes         Task: Discuss recognizing feelings about having diabetes Completed 12/13/2023   Responsible User: Denice Nickerson RD        Task: Provide education on the benefits of making appropriate lifestyle changes Completed 12/13/2023   Responsible User: Denice Nickerson RD        Task: Provide education on benefits of utilizing support systems Completed 12/13/2023   Responsible User: Denice Nickerson RD Elizabeth Swartout RD, ELLIOT, [facility]  Diabetes Education      Time Spent: 41 minutes  Encounter Type: Individual    A copy of this encounter was shared with the provider.   .

## 2024-03-10 ENCOUNTER — HEALTH MAINTENANCE LETTER (OUTPATIENT)
Age: 49
End: 2024-03-10

## 2024-06-12 ENCOUNTER — TRANSFERRED RECORDS (OUTPATIENT)
Dept: MULTI SPECIALTY CLINIC | Facility: CLINIC | Age: 49
End: 2024-06-12

## 2024-06-12 LAB — RETINOPATHY: NORMAL

## 2024-07-28 ENCOUNTER — HEALTH MAINTENANCE LETTER (OUTPATIENT)
Age: 49
End: 2024-07-28

## 2024-08-09 ENCOUNTER — OFFICE VISIT (OUTPATIENT)
Dept: FAMILY MEDICINE | Facility: CLINIC | Age: 49
End: 2024-08-09
Payer: COMMERCIAL

## 2024-08-09 VITALS
HEIGHT: 63 IN | BODY MASS INDEX: 33.5 KG/M2 | OXYGEN SATURATION: 99 % | HEART RATE: 100 BPM | DIASTOLIC BLOOD PRESSURE: 85 MMHG | SYSTOLIC BLOOD PRESSURE: 125 MMHG | WEIGHT: 189.1 LBS | RESPIRATION RATE: 14 BRPM | TEMPERATURE: 97.1 F

## 2024-08-09 DIAGNOSIS — E11.9 TYPE 2 DIABETES MELLITUS WITHOUT COMPLICATION, WITHOUT LONG-TERM CURRENT USE OF INSULIN (H): Primary | ICD-10-CM

## 2024-08-09 DIAGNOSIS — M79.672 LEFT FOOT PAIN: ICD-10-CM

## 2024-08-09 DIAGNOSIS — E78.5 HYPERLIPIDEMIA LDL GOAL <100: ICD-10-CM

## 2024-08-09 DIAGNOSIS — Z91.018 ALLERGY TO OTHER FOODS: ICD-10-CM

## 2024-08-09 DIAGNOSIS — I10 ESSENTIAL HYPERTENSION: ICD-10-CM

## 2024-08-09 DIAGNOSIS — Z29.81 ENCOUNTER FOR HIV PRE-EXPOSURE PROPHYLAXIS: ICD-10-CM

## 2024-08-09 LAB
ALBUMIN SERPL BCG-MCNC: 4.7 G/DL (ref 3.5–5.2)
ALP SERPL-CCNC: 85 U/L (ref 40–150)
ALT SERPL W P-5'-P-CCNC: 33 U/L (ref 0–70)
ANION GAP SERPL CALCULATED.3IONS-SCNC: 12 MMOL/L (ref 7–15)
AST SERPL W P-5'-P-CCNC: 25 U/L (ref 0–45)
BILIRUB SERPL-MCNC: 0.3 MG/DL
BUN SERPL-MCNC: 24.3 MG/DL (ref 6–20)
CALCIUM SERPL-MCNC: 10.3 MG/DL (ref 8.8–10.4)
CHLORIDE SERPL-SCNC: 106 MMOL/L (ref 98–107)
CHOLEST SERPL-MCNC: 196 MG/DL
CREAT SERPL-MCNC: 1.39 MG/DL (ref 0.51–1.17)
EGFRCR SERPLBLD CKD-EPI 2021: 62 ML/MIN/1.73M2
FASTING STATUS PATIENT QL REPORTED: YES
FASTING STATUS PATIENT QL REPORTED: YES
GLUCOSE SERPL-MCNC: 121 MG/DL (ref 70–99)
HBA1C MFR BLD: 6.5 % (ref 0–5.6)
HCO3 SERPL-SCNC: 23 MMOL/L (ref 22–29)
HCV AB SERPL QL IA: NONREACTIVE
HDLC SERPL-MCNC: 45 MG/DL
HIV 1+2 AB+HIV1 P24 AG SERPL QL IA: NONREACTIVE
LDLC SERPL CALC-MCNC: 116 MG/DL
NONHDLC SERPL-MCNC: 151 MG/DL
POTASSIUM SERPL-SCNC: 4.6 MMOL/L (ref 3.4–5.3)
PROT SERPL-MCNC: 7.9 G/DL (ref 6.4–8.3)
SODIUM SERPL-SCNC: 141 MMOL/L (ref 135–145)
TRIGL SERPL-MCNC: 173 MG/DL

## 2024-08-09 PROCEDURE — 87591 N.GONORRHOEAE DNA AMP PROB: CPT | Performed by: INTERNAL MEDICINE

## 2024-08-09 PROCEDURE — 90750 HZV VACC RECOMBINANT IM: CPT | Performed by: INTERNAL MEDICINE

## 2024-08-09 PROCEDURE — 90472 IMMUNIZATION ADMIN EACH ADD: CPT | Performed by: INTERNAL MEDICINE

## 2024-08-09 PROCEDURE — 83036 HEMOGLOBIN GLYCOSYLATED A1C: CPT | Performed by: INTERNAL MEDICINE

## 2024-08-09 PROCEDURE — 36415 COLL VENOUS BLD VENIPUNCTURE: CPT | Performed by: INTERNAL MEDICINE

## 2024-08-09 PROCEDURE — 86780 TREPONEMA PALLIDUM: CPT | Performed by: INTERNAL MEDICINE

## 2024-08-09 PROCEDURE — 90471 IMMUNIZATION ADMIN: CPT | Performed by: INTERNAL MEDICINE

## 2024-08-09 PROCEDURE — 80061 LIPID PANEL: CPT | Performed by: INTERNAL MEDICINE

## 2024-08-09 PROCEDURE — 87389 HIV-1 AG W/HIV-1&-2 AB AG IA: CPT | Performed by: INTERNAL MEDICINE

## 2024-08-09 PROCEDURE — 80053 COMPREHEN METABOLIC PANEL: CPT | Performed by: INTERNAL MEDICINE

## 2024-08-09 PROCEDURE — 99214 OFFICE O/P EST MOD 30 MIN: CPT | Mod: 25 | Performed by: INTERNAL MEDICINE

## 2024-08-09 PROCEDURE — 87491 CHLMYD TRACH DNA AMP PROBE: CPT | Performed by: INTERNAL MEDICINE

## 2024-08-09 PROCEDURE — 90677 PCV20 VACCINE IM: CPT | Performed by: INTERNAL MEDICINE

## 2024-08-09 PROCEDURE — 86803 HEPATITIS C AB TEST: CPT | Performed by: INTERNAL MEDICINE

## 2024-08-09 RX ORDER — LISINOPRIL 5 MG/1
5 TABLET ORAL DAILY
Qty: 90 TABLET | Refills: 3 | Status: SHIPPED | OUTPATIENT
Start: 2024-08-09

## 2024-08-09 RX ORDER — EMTRICITABINE AND TENOFOVIR DISOPROXIL FUMARATE 200; 300 MG/1; MG/1
1 TABLET, FILM COATED ORAL DAILY
Qty: 30 TABLET | Refills: 11 | Status: SHIPPED | OUTPATIENT
Start: 2024-08-09

## 2024-08-09 RX ORDER — ATORVASTATIN CALCIUM 20 MG/1
20 TABLET, FILM COATED ORAL DAILY
Qty: 90 TABLET | Refills: 3 | Status: SHIPPED | OUTPATIENT
Start: 2024-08-09

## 2024-08-09 RX ORDER — BLOOD-GLUCOSE SENSOR
1 EACH MISCELLANEOUS
Qty: 2 EACH | Refills: 11 | Status: SHIPPED | OUTPATIENT
Start: 2024-08-09

## 2024-08-09 ASSESSMENT — PAIN SCALES - GENERAL: PAINLEVEL: NO PAIN (0)

## 2024-08-09 NOTE — NURSING NOTE
Prior to immunization administration, verified patients identity using patient s name and date of birth. Please see Immunization Activity for additional information.     Screening Questionnaire for Adult Immunization    Are you sick today?   No   Do you have allergies to medications, food, a vaccine component or latex?   Don't Know   Have you ever had a serious reaction after receiving a vaccination?   No   Do you have a long-term health problem with heart, lung, kidney, or metabolic disease (e.g., diabetes), asthma, a blood disorder, no spleen, complement component deficiency, a cochlear implant, or a spinal fluid leak?  Are you on long-term aspirin therapy?   No   Do you have cancer, leukemia, HIV/AIDS, or any other immune system problem?   No   Do you have a parent, brother, or sister with an immune system problem?   No   In the past 3 months, have you taken medications that affect  your immune system, such as prednisone, other steroids, or anticancer drugs; drugs for the treatment of rheumatoid arthritis, Crohn s disease, or psoriasis; or have you had radiation treatments?   No   Have you had a seizure, or a brain or other nervous system problem?   No   During the past year, have you received a transfusion of blood or blood    products, or been given immune (gamma) globulin or antiviral drug?   No   For women: Are you pregnant or is there a chance you could become       pregnant during the next month?   No   Have you received any vaccinations in the past 4 weeks?   No     Immunization questionnaire answers were all negative.    ABN signed and placed in accordion folder.     Patient instructed to remain in clinic for 15 minutes afterwards, and to report any adverse reactions.     Screening performed by Adenike Badillo CMA on 8/9/2024 at 11:48 AM.

## 2024-08-09 NOTE — PROGRESS NOTES
Subjective   Angel is a 48 year old, presenting for the following health issues:  Diabetes      Via the Health Maintenance questionnaire, the patient has reported the following services have been completed -Eye Exam: [facility], [place] 2024-06-12, this information has been sent to the abstraction team.  History of Present Illness       Diabetes:   Angel presents for follow up of diabetes.    Angel is not checking blood glucose.        Angel is concerned about other.   Angel is having numbness in feet and burning in feet.  The patient has had a diabetic eye exam in the last 12 months. Eye exam performed on 6/12/2024. Location of last eye exam don't know.        Hyperlipidemia:  Angel presents for follow up of hyperlipidemia.   Angel is taking medication to lower cholesterol. Angel is not having myalgia or other side effects to statin medications.    Hypertension: Angel presents for follow up of hypertension.  Angel does not check blood pressure  regularly outside of the clinic. Outpatient blood pressures have not been over 140/90. Angel follows a low salt diet.     Reason for visit:  Follow up physical/diabetes, pain in left foot toes, potential allergy,    Angel eats 0-1 servings of fruits and vegetables daily.Angel consumes 1 sweetened beverage(s) daily.Angel exercises with enough effort to increase Angel's heart rate 20 to 29 minutes per day.  Angel exercises with enough effort to increase Angel's heart rate 3 or less days per week.   Angel is taking medications regularly.     Type 2 diabetes mellitus    Angel Mohr has been doing well.  He is exercising about 10 minutes per day.  His weight has improved and he is trying to eat healthy.  He notes some neuropathy at times.  Taking atorvastatin and lisinopril for blood pressure control and cholesterol management   Food allergy   He noted that he had eaten an orange protein drink and notes that he may have an allergic reaction.    Desire for PREP   Would like  "to initiated PREP.  No recent exposures.  Has never taken antiretroviral       Review of Systems  Constitutional, HEENT, cardiovascular, pulmonary, GI, , musculoskeletal, neuro, skin, endocrine and psych systems are negative, except as otherwise noted.      Objective    /85 (BP Location: Left arm, Patient Position: Sitting, Cuff Size: Adult Large)   Pulse 100   Temp 97.1  F (36.2  C) (Tympanic)   Resp 14   Ht 1.6 m (5' 3\")   Wt 85.8 kg (189 lb 1.6 oz)   SpO2 99%   BMI 33.50 kg/m    Body mass index is 33.5 kg/m .  Physical Exam   GENERAL: alert and no distress  EYES: Eyes grossly normal to inspection,   HENT: ear canals and TM's normal,   NECK: no adenopathy, no asymmetry, masses, or scars  RESP: lungs clear to auscultation - no rales, rhonchi or wheezes  CV: regular rate and rhythm, normal S1 S2, no S3 or S4,   ABDOMEN: soft, nontender, no hepatosplenomegaly, no masses and bowel sounds normal  MS: no gross musculoskeletal defects noted, no edema  SKIN: no suspicious lesions or rashes  NEURO: Normal strength and tone, mentation intact and speech normal  PSYCH: mentation appears normal, affect normal/bright    Results for orders placed or performed in visit on 08/09/24   HEMOGLOBIN A1C     Status: Abnormal   Result Value Ref Range    Hemoglobin A1C 6.5 (H) 0.0 - 5.6 %   Lipid panel reflex to direct LDL Non-fasting     Status: Abnormal   Result Value Ref Range    Cholesterol 196 <200 mg/dL    Triglycerides 173 (H) <150 mg/dL    Direct Measure HDL 45 >=40 mg/dL    LDL Cholesterol Calculated 116 (H) <=100 mg/dL    Non HDL Cholesterol 151 (H) <130 mg/dL    Patient Fasting > 8hrs? Yes     Narrative    Cholesterol  Desirable:  <200 mg/dL    Triglycerides  Normal:  Less than 150 mg/dL  Borderline High:  150-199 mg/dL  High:  200-499 mg/dL  Very High:  Greater than or equal to 500 mg/dL    Direct Measure HDL  Female:  Greater than or equal to 50 mg/dL   Male:  Greater than or equal to 40 mg/dL    LDL " "Cholesterol  Desirable:  <100mg/dL  Above Desirable:  100-129 mg/dL   Borderline High:  130-159 mg/dL   High:  160-189 mg/dL   Very High:  >= 190 mg/dL    Non HDL Cholesterol  Desirable:  130 mg/dL  Above Desirable:  130-159 mg/dL  Borderline High:  160-189 mg/dL  High:  190-219 mg/dL  Very High:  Greater than or equal to 220 mg/dL   HIV Antigen Antibody Combo     Status: Normal   Result Value Ref Range    HIV Antigen Antibody Combo Nonreactive Nonreactive   Treponema Abs w Reflex to RPR and Titer     Status: Normal   Result Value Ref Range    Treponema Antibody Total Nonreactive Nonreactive   Hepatitis C Screen Reflex to HCV RNA Quant and Genotype     Status: Normal   Result Value Ref Range    Hepatitis C Antibody Nonreactive Nonreactive   Comprehensive metabolic panel (BMP + Alb, Alk Phos, ALT, AST, Total. Bili, TP)     Status: Abnormal   Result Value Ref Range    Sodium 141 135 - 145 mmol/L    Potassium 4.6 3.4 - 5.3 mmol/L    Carbon Dioxide (CO2) 23 22 - 29 mmol/L    Anion Gap 12 7 - 15 mmol/L    Urea Nitrogen 24.3 (H) 6.0 - 20.0 mg/dL    Creatinine 1.39 (H) 0.51 - 1.17 mg/dL    GFR Estimate 62 >60 mL/min/1.73m2    Calcium 10.3 8.8 - 10.4 mg/dL    Chloride 106 98 - 107 mmol/L    Glucose 121 (H) 70 - 99 mg/dL    Alkaline Phosphatase 85 40 - 150 U/L    AST 25 0 - 45 U/L    ALT 33 0 - 70 U/L    Protein Total 7.9 6.4 - 8.3 g/dL    Albumin 4.7 3.5 - 5.2 g/dL    Bilirubin Total 0.3 <=1.2 mg/dL    Patient Fasting > 8hrs? Yes     Narrative    The generation of reference intervals for this test is currently based on binary male or female sex. If the electronic health record information indicates another gender identity or if Legal Sex is recorded as \"Unknown\", both male and female reference intervals are provided where applicable, and should be considered according to the individual's appropriate clinical context.   NEISSERIA GONORRHOEA PCR     Status: Normal    Specimen: Urethra; Urine   Result Value Ref Range    " Neisseria gonorrhoeae Negative Negative   CHLAMYDIA TRACHOMATIS PCR     Status: Normal    Specimen: Urethra; Urine   Result Value Ref Range    Chlamydia trachomatis Negative Negative        Patient Instructions   (E11.9) Type 2 diabetes mellitus without complication, without long-term current use of insulin (H)  (primary encounter diagnosis)  Comment: we will check labs today and continue dietary management and follow up in nutrition if blood glucose elevates  Plan: HEMOGLOBIN A1C, Lipid panel reflex to direct         LDL Non-fasting, Continuous Glucose Sensor         (FREESTYLE RICARDA 3 SENSOR) MISC, blood glucose         monitoring (NO BRAND SPECIFIED) meter device         kit, Comprehensive metabolic panel (BMP + Alb,         Alk Phos, ALT, AST, Total. Bili, TP)            (Z91.018) Allergy to other foods  Comment: Referral to allergy placed   Plan: Adult Allergy/Asthma  Referral            (I10) Essential hypertension  Comment: blood pressure is excellent with current blood pressure medications   Plan: lisinopril (ZESTRIL) 5 MG tablet            (E78.5) Hyperlipidemia LDL goal <100  Comment: Check fasting lipid panel today and continue atorvastatin   Plan: atorvastatin (LIPITOR) 20 MG tablet            (M79.672) Left foot pain  Comment: Referral to podiatry placed   Plan: Orthopedic  Referral            (Z29.81) Encounter for HIV pre-exposure prophylaxis  Comment: REcommend screening and pending results we will start PREP.  Follow up in 3 months   Plan: NEISSERIA GONORRHOEA PCR, CHLAMYDIA TRACHOMATIS        PCR, HIV Antigen Antibody Combo, Treponema Abs         w Reflex to RPR and Titer, Hepatitis C Screen         Reflex to HCV RNA Quant and Genotype,         emtricitabine-tenofovir (TRUVADA) 200-300 MG         per tablet                  The longitudinal plan of care for the diagnosis(es)/condition(s) as documented were addressed during this visit. Due to the added complexity in care, I will  continue to support Angel in the subsequent management and with ongoing continuity of care.    Signed Electronically by: Chintan Marinelli MD, MD

## 2024-08-09 NOTE — PATIENT INSTRUCTIONS
(E11.9) Type 2 diabetes mellitus without complication, without long-term current use of insulin (H)  (primary encounter diagnosis)  Comment: we will check labs today and continue dietary management and follow up in nutrition if blood glucose elevates  Plan: HEMOGLOBIN A1C, Lipid panel reflex to direct         LDL Non-fasting, Continuous Glucose Sensor         (FREESTYLE RICARDA 3 SENSOR) MISC, blood glucose         monitoring (NO BRAND SPECIFIED) meter device         kit, Comprehensive metabolic panel (BMP + Alb,         Alk Phos, ALT, AST, Total. Bili, TP)            (Z91.018) Allergy to other foods  Comment: Referral to allergy placed   Plan: Adult Allergy/Asthma  Referral            (I10) Essential hypertension  Comment: blood pressure is excellent with current blood pressure medications   Plan: lisinopril (ZESTRIL) 5 MG tablet            (E78.5) Hyperlipidemia LDL goal <100  Comment: Check fasting lipid panel today and continue atorvastatin   Plan: atorvastatin (LIPITOR) 20 MG tablet            (M79.672) Left foot pain  Comment: Referral to podiatry placed   Plan: Orthopedic  Referral            (Z29.81) Encounter for HIV pre-exposure prophylaxis  Comment: REcommend screening and pending results we will start PREP.  Follow up in 3 months   Plan: NEISSERIA GONORRHOEA PCR, CHLAMYDIA TRACHOMATIS        PCR, HIV Antigen Antibody Combo, Treponema Abs         w Reflex to RPR and Titer, Hepatitis C Screen         Reflex to HCV RNA Quant and Genotype,         emtricitabine-tenofovir (TRUVADA) 200-300 MG         per tablet

## 2024-08-10 LAB
C TRACH DNA SPEC QL NAA+PROBE: NEGATIVE
N GONORRHOEA DNA SPEC QL NAA+PROBE: NEGATIVE
T PALLIDUM AB SER QL: NONREACTIVE

## 2024-08-12 ENCOUNTER — TRANSFERRED RECORDS (OUTPATIENT)
Dept: HEALTH INFORMATION MANAGEMENT | Facility: CLINIC | Age: 49
End: 2024-08-12
Payer: COMMERCIAL

## 2024-08-12 NOTE — RESULT ENCOUNTER NOTE
Bret Ortiz,    I had the opportunity to review your recent labs and a summary of your labs reads as follows:    Your complete blood counts show no sign of anemia, normal white blood cell count and platelets.  Your comprehensive metabolic panel showed normal renal function, normal liver function  Your hemoglobin A1c shows stable average blood glucose   Your fasting lipid panel show  - normal HDL (good) cholesterol -as your goal is greater than 40  - elevated LDL (bad) cholesterol as your goal is less than 100 - we can plan to recheck next year - continue atorvastatin  - normal triglyceride levels  All screening tests for sexually transmitted diseases were negative      Sincerely,  Chintan Marinelli MD

## 2024-08-28 ENCOUNTER — MYC MEDICAL ADVICE (OUTPATIENT)
Dept: FAMILY MEDICINE | Facility: CLINIC | Age: 49
End: 2024-08-28
Payer: COMMERCIAL

## 2024-10-06 ENCOUNTER — HEALTH MAINTENANCE LETTER (OUTPATIENT)
Age: 49
End: 2024-10-06

## 2024-11-07 SDOH — HEALTH STABILITY: PHYSICAL HEALTH: ON AVERAGE, HOW MANY DAYS PER WEEK DO YOU ENGAGE IN MODERATE TO STRENUOUS EXERCISE (LIKE A BRISK WALK)?: 5 DAYS

## 2024-11-07 SDOH — HEALTH STABILITY: PHYSICAL HEALTH: ON AVERAGE, HOW MANY MINUTES DO YOU ENGAGE IN EXERCISE AT THIS LEVEL?: 40 MIN

## 2024-11-07 ASSESSMENT — SOCIAL DETERMINANTS OF HEALTH (SDOH): HOW OFTEN DO YOU GET TOGETHER WITH FRIENDS OR RELATIVES?: THREE TIMES A WEEK

## 2024-11-12 ENCOUNTER — OFFICE VISIT (OUTPATIENT)
Dept: FAMILY MEDICINE | Facility: CLINIC | Age: 49
End: 2024-11-12
Payer: COMMERCIAL

## 2024-11-12 VITALS
HEART RATE: 94 BPM | RESPIRATION RATE: 16 BRPM | DIASTOLIC BLOOD PRESSURE: 83 MMHG | SYSTOLIC BLOOD PRESSURE: 122 MMHG | HEIGHT: 63 IN | BODY MASS INDEX: 32.78 KG/M2 | WEIGHT: 185 LBS | OXYGEN SATURATION: 98 %

## 2024-11-12 DIAGNOSIS — I10 ESSENTIAL HYPERTENSION: ICD-10-CM

## 2024-11-12 DIAGNOSIS — Z29.81 ENCOUNTER FOR HIV PRE-EXPOSURE PROPHYLAXIS: ICD-10-CM

## 2024-11-12 DIAGNOSIS — Z00.00 ROUTINE GENERAL MEDICAL EXAMINATION AT A HEALTH CARE FACILITY: Primary | ICD-10-CM

## 2024-11-12 DIAGNOSIS — Z12.5 SCREENING FOR PROSTATE CANCER: ICD-10-CM

## 2024-11-12 DIAGNOSIS — E11.9 TYPE 2 DIABETES MELLITUS WITHOUT COMPLICATION, WITHOUT LONG-TERM CURRENT USE OF INSULIN (H): ICD-10-CM

## 2024-11-12 DIAGNOSIS — E78.5 HYPERLIPIDEMIA LDL GOAL <100: ICD-10-CM

## 2024-11-12 LAB
ALBUMIN SERPL BCG-MCNC: 4.5 G/DL (ref 3.5–5.2)
ALP SERPL-CCNC: 87 U/L (ref 40–150)
ALT SERPL W P-5'-P-CCNC: 53 U/L (ref 0–70)
ANION GAP SERPL CALCULATED.3IONS-SCNC: 11 MMOL/L (ref 7–15)
AST SERPL W P-5'-P-CCNC: 44 U/L (ref 0–45)
BILIRUB SERPL-MCNC: 0.4 MG/DL
BUN SERPL-MCNC: 12.5 MG/DL (ref 6–20)
CALCIUM SERPL-MCNC: 9.6 MG/DL (ref 8.8–10.4)
CHLORIDE SERPL-SCNC: 110 MMOL/L (ref 98–107)
CHOLEST SERPL-MCNC: 97 MG/DL
CREAT SERPL-MCNC: 1.51 MG/DL (ref 0.51–1.17)
CREAT UR-MCNC: 206 MG/DL
EGFRCR SERPLBLD CKD-EPI 2021: 56 ML/MIN/1.73M2
EST. AVERAGE GLUCOSE BLD GHB EST-MCNC: 100 MG/DL
FASTING STATUS PATIENT QL REPORTED: YES
FASTING STATUS PATIENT QL REPORTED: YES
GLUCOSE SERPL-MCNC: 119 MG/DL (ref 70–99)
HBA1C MFR BLD: 5.1 % (ref 0–5.6)
HCO3 SERPL-SCNC: 20 MMOL/L (ref 22–29)
HDLC SERPL-MCNC: 40 MG/DL
LDLC SERPL CALC-MCNC: 44 MG/DL
MICROALBUMIN UR-MCNC: 74.5 MG/L
MICROALBUMIN/CREAT UR: 36.17 MG/G CR (ref 0–25)
NONHDLC SERPL-MCNC: 57 MG/DL
POTASSIUM SERPL-SCNC: 4.6 MMOL/L (ref 3.4–5.3)
PROT SERPL-MCNC: 7.3 G/DL (ref 6.4–8.3)
PSA SERPL DL<=0.01 NG/ML-MCNC: 1.85 NG/ML (ref 0–3.5)
SODIUM SERPL-SCNC: 141 MMOL/L (ref 135–145)
TRIGL SERPL-MCNC: 63 MG/DL

## 2024-11-12 PROCEDURE — 82570 ASSAY OF URINE CREATININE: CPT | Performed by: INTERNAL MEDICINE

## 2024-11-12 PROCEDURE — 90750 HZV VACC RECOMBINANT IM: CPT | Performed by: INTERNAL MEDICINE

## 2024-11-12 PROCEDURE — 82043 UR ALBUMIN QUANTITATIVE: CPT | Performed by: INTERNAL MEDICINE

## 2024-11-12 PROCEDURE — G0103 PSA SCREENING: HCPCS | Performed by: INTERNAL MEDICINE

## 2024-11-12 PROCEDURE — 99396 PREV VISIT EST AGE 40-64: CPT | Mod: 25 | Performed by: INTERNAL MEDICINE

## 2024-11-12 PROCEDURE — 80053 COMPREHEN METABOLIC PANEL: CPT | Performed by: INTERNAL MEDICINE

## 2024-11-12 PROCEDURE — 36415 COLL VENOUS BLD VENIPUNCTURE: CPT | Performed by: INTERNAL MEDICINE

## 2024-11-12 PROCEDURE — 90471 IMMUNIZATION ADMIN: CPT | Performed by: INTERNAL MEDICINE

## 2024-11-12 PROCEDURE — 80061 LIPID PANEL: CPT | Performed by: INTERNAL MEDICINE

## 2024-11-12 PROCEDURE — 99214 OFFICE O/P EST MOD 30 MIN: CPT | Mod: 25 | Performed by: INTERNAL MEDICINE

## 2024-11-12 PROCEDURE — 83036 HEMOGLOBIN GLYCOSYLATED A1C: CPT | Performed by: INTERNAL MEDICINE

## 2024-11-12 RX ORDER — ATORVASTATIN CALCIUM 20 MG/1
20 TABLET, FILM COATED ORAL DAILY
Qty: 90 TABLET | Refills: 3 | Status: SHIPPED | OUTPATIENT
Start: 2024-11-12

## 2024-11-12 RX ORDER — ACYCLOVIR 800 MG/1
1 TABLET ORAL
Qty: 2 EACH | Refills: 11 | Status: SHIPPED | OUTPATIENT
Start: 2024-11-12

## 2024-11-12 RX ORDER — LISINOPRIL 5 MG/1
5 TABLET ORAL DAILY
Qty: 90 TABLET | Refills: 3 | Status: SHIPPED | OUTPATIENT
Start: 2024-11-12

## 2024-11-12 RX ORDER — LANCETS
EACH MISCELLANEOUS
Qty: 100 EACH | Refills: 11 | Status: SHIPPED | OUTPATIENT
Start: 2024-11-12

## 2024-11-12 RX ORDER — FAMOTIDINE 20 MG/1
20 TABLET, FILM COATED ORAL
COMMUNITY
Start: 2024-08-12

## 2024-11-12 ASSESSMENT — PAIN SCALES - GENERAL: PAINLEVEL_OUTOF10: NO PAIN (0)

## 2024-11-12 NOTE — PROGRESS NOTES
Preventive Care Visit  [facility] EZIO Marinelli MD, MD, Internal Medicine  Nov 12, 2024        Subjective   Angel is a 49 year old, presenting for the following:  Physical (Fasting; gender transition discuss)       HPI          Health Care Directive  Patient does not have a Health Care Directive: Discussed advance care planning with patient; information given to patient to review.      11/7/2024   General Health   How would you rate your overall physical health? Good   Feel stress (tense, anxious, or unable to sleep) To some extent      (!) STRESS CONCERN      11/7/2024   Nutrition   Three or more servings of calcium each day? Yes   Diet: Low salt    Low fat/cholesterol    Diabetic    Carbohydrate counting    Gluten-free/reduced   How many servings of fruit and vegetables per day? (!) 0-1   How many sweetened beverages each day? 0-1       Multiple values from one day are sorted in reverse-chronological order         11/7/2024   Exercise   Days per week of moderate/strenous exercise 5 days   Average minutes spent exercising at this level 40 min            11/7/2024   Social Factors   Frequency of gathering with friends or relatives Three times a week   Worry food won't last until get money to buy more Yes   Food not last or not have enough money for food? Yes   Do you have housing? (Housing is defined as stable permanent housing and does not include staying ouside in a car, in a tent, in an abandoned building, in an overnight shelter, or couch-surfing.) Yes   Are you worried about losing your housing? No   Lack of transportation? No   Unable to get utilities (heat,electricity)? No      (!) FOOD SECURITY CONCERN PRESENT      11/7/2024   Fall Risk   Fallen 2 or more times in the past year? No    Trouble with walking or balance? No        Patient-reported          11/7/2024   Dental   Dentist two times every year? Yes            11/7/2024   TB Screening   Were you born outside of the [place]?  "Yes                  11/7/2024   Substance Use   Alcohol more than 3/day or more than 7/wk No   Do you use any other substances recreationally? No        Social History     Tobacco Use     Smoking status: Never     Smokeless tobacco: Never   Vaping Use     Vaping status: Never Used   Substance Use Topics     Alcohol use: Yes     Comment: 0-1 drink a month     Drug use: Never           11/7/2024   STI Screening   New sexual partner(s) since last STI/HIV test? No      ASCVD Risk   The 10-year ASCVD risk score (Devante PHAN, et al., 2019) is: 8.4%    Values used to calculate the score:      Age: 49 years      Sex: Male      Is Non- : No      Diabetic: Yes      Tobacco smoker: No      Systolic Blood Pressure: 122 mmHg      Is BP treated: Yes      HDL Cholesterol: 45 mg/dL      Total Cholesterol: 196 mg/dL           Reviewed and updated as needed this visit by Provider                    Past Medical History:   Diagnosis Date     Depressive disorder 2018     Diverticulosis of large intestine      History of colonic polyps      Hypertension 2019        Diabetes mellitus, type 2 (H)    Has been checking blood glucose once daily and readings have been between 100-130's.   Hyperlipidemia   Has continued on statin.  No concerns    Review of Systems  Constitutional, HEENT, cardiovascular, pulmonary, GI, , musculoskeletal, neuro, skin, endocrine and psych systems are negative, except as otherwise noted.     Objective    Exam  /83 (BP Location: Right arm, Patient Position: Sitting, Cuff Size: Adult Large)   Pulse 94   Resp 16   Ht 1.595 m (5' 2.8\")   Wt 83.9 kg (185 lb)   SpO2 98%   BMI 32.99 kg/m     Estimated body mass index is 32.99 kg/m  as calculated from the following:    Height as of this encounter: 1.595 m (5' 2.8\").    Weight as of this encounter: 83.9 kg (185 lb).    Physical Exam  GENERAL: alert and no distress  EYES: Eyes grossly normal to inspection,   HENT: ear canals and " TM's normal, nose and mouth without ulcers or lesions  NECK: no adenopathy, no asymmetry, masses, or scars  RESP: lungs clear to auscultation - no rales, rhonchi or wheezes  CV: regular rate and rhythm, normal S1 S2, no S3 or S4, no murmur, click or rub, no peripheral edema  ABDOMEN: obese, soft, nontender, no hepatosplenomegaly, no masses and bowel sounds normal  MS: no gross musculoskeletal defects noted, no edema  SKIN: no suspicious lesions or rashes  NEURO: Normal strength and tone, mentation intact and speech normal  PSYCH: mentation appears normal, affect normal/bright    Patient Instructions   (Z00.00) Routine general medical examination at a health care facility  (primary encounter diagnosis)  Comment:For routine exam, we will draw labs as ordered, cholesterol, diabetes mellitus check, liver function, renal function, PSA   We will also update vaccination history.   Plan:     (E78.5) Hyperlipidemia LDL goal <100  Comment: Check fasting lipid panel and we will check fasting lipid panel   Plan: atorvastatin (LIPITOR) 20 MG tablet,         Comprehensive metabolic panel, Lipid panel         reflex to direct LDL Non-fasting            (I10) Essential hypertension  Comment: blood pressure is excellent with lower dose of lisinopril  Plan: lisinopril (ZESTRIL) 5 MG tablet            (Z29.81) Encounter for HIV pre-exposure prophylaxis  Comment: discontinued Truvada  Plan:     (E11.9) Type 2 diabetes mellitus without complication, without long-term current use of insulin (H)  Comment: Check hemoglobin A1c and we will continue healthy lifestyle and we will hold off on starting any medications for diabetes mellitus at this time  Plan: HEMOGLOBIN A1C, Albumin Random Urine         Quantitative with Creat Ratio, Continuous         Glucose Sensor (FREESTYLE RICARDA 3 SENSOR) MISC,        blood glucose (ACCU-CHEK GUIDE) test strip,         blood glucose monitoring (SOFTCLIX) lancets            (Z12.5) Screening for prostate  cancer  Comment:   Plan: Prostate Specific Antigen Screen                Signed Electronically by: Chintan Marinelli MD, MD

## 2024-11-12 NOTE — PATIENT INSTRUCTIONS
(Z00.00) Routine general medical examination at a health care facility  (primary encounter diagnosis)  Comment:For routine exam, we will draw labs as ordered, cholesterol, diabetes mellitus check, liver function, renal function, PSA   We will also update vaccination history.   Plan:     (E78.5) Hyperlipidemia LDL goal <100  Comment: Check fasting lipid panel and we will check fasting lipid panel   Plan: atorvastatin (LIPITOR) 20 MG tablet,         Comprehensive metabolic panel, Lipid panel         reflex to direct LDL Non-fasting            (I10) Essential hypertension  Comment: blood pressure is excellent with lower dose of lisinopril  Plan: lisinopril (ZESTRIL) 5 MG tablet            (Z29.81) Encounter for HIV pre-exposure prophylaxis  Comment: discontinued Truvada  Plan:     (E11.9) Type 2 diabetes mellitus without complication, without long-term current use of insulin (H)  Comment: Check hemoglobin A1c and we will continue healthy lifestyle and we will hold off on starting any medications for diabetes mellitus at this time  Plan: HEMOGLOBIN A1C, Albumin Random Urine         Quantitative with Creat Ratio, Continuous         Glucose Sensor (FREESTYLE RICARDA 3 SENSOR) MISC,        blood glucose (ACCU-CHEK GUIDE) test strip,         blood glucose monitoring (SOFTCLIX) lancets            (Z12.5) Screening for prostate cancer  Comment:   Plan: Prostate Specific Antigen Screen

## 2024-11-14 ENCOUNTER — TELEPHONE (OUTPATIENT)
Dept: FAMILY MEDICINE | Facility: CLINIC | Age: 49
End: 2024-11-14

## 2024-11-14 DIAGNOSIS — I10 ESSENTIAL HYPERTENSION: Primary | ICD-10-CM

## 2024-11-14 NOTE — RESULT ENCOUNTER NOTE
Bret Ortiz,    I had the opportunity to review your recent labs and a summary of your labs reads as follows:    Your complete blood counts show no sign of anemia, normal white blood cell count and platelets.  Your comprehensive metabolic panel showed normal liver function, and stable elevated  fasting blood glucose   Your hemoglobin A1c also shows stable average blood glucose   Your urine albumin is improved since starting lisinopril   Your fasting lipid panel show  - normal HDL (good) cholesterol -as your goal is greater than 40  - low LDL (bad) cholesterol as your goal is less than 100  - normal triglyceride levels    Your renal function labs show persistent elevation of your creatinine.  It may be due to slight dehydration and I would recommend we increase fluid intake 8 x 8 oz - 64 oz water and avoid all NSAIDs (advil, ibuprofen, etc) and recheck again at your convenience    Sincerely,  Chintan Marinelli MD

## 2024-11-14 NOTE — TELEPHONE ENCOUNTER
Can we call Angel REYNA Traviszayra and let him know that       Hi Angel,    I had the opportunity to review your recent labs and a summary of your labs reads as follows:    Your complete blood counts show no sign of anemia, normal white blood cell count and platelets.  Your comprehensive metabolic panel showed normal liver function, and stable elevated  fasting blood glucose    Your hemoglobin A1c also shows stable average blood glucose   Your urine albumin is improved since starting lisinopril   Your fasting lipid panel show  - normal HDL (good) cholesterol -as your goal is greater than 40  - low LDL (bad) cholesterol as your goal is less than 100  - normal triglyceride levels    Your renal function labs show persistent elevation of your creatinine.  It may be due to slight dehydration and I would recommend we increase fluid intake 8 x 8 oz - 64 oz water and avoid all NSAIDs (advil, ibuprofen, etc) and recheck again at your convenience    Sincerely,  Chintan Marinelli MD

## 2024-11-15 ENCOUNTER — TRANSFERRED RECORDS (OUTPATIENT)
Dept: HEALTH INFORMATION MANAGEMENT | Facility: CLINIC | Age: 49
End: 2024-11-15

## 2024-11-19 ENCOUNTER — TELEPHONE (OUTPATIENT)
Dept: PLASTIC SURGERY | Facility: CLINIC | Age: 49
End: 2024-11-19
Payer: COMMERCIAL

## 2024-11-19 ENCOUNTER — MYC MEDICAL ADVICE (OUTPATIENT)
Dept: FAMILY MEDICINE | Facility: CLINIC | Age: 49
End: 2024-11-19
Payer: COMMERCIAL

## 2024-11-19 ENCOUNTER — TELEPHONE (OUTPATIENT)
Dept: FAMILY MEDICINE | Facility: CLINIC | Age: 49
End: 2024-11-19
Payer: COMMERCIAL

## 2024-11-19 NOTE — CONFIDENTIAL NOTE
Writer called pt back re: DEBRA requesting HRT provider. We discussed nearest options to [place], [place] where pt lives and pt preferred [facility] for proximity. Writer sent in basket message to Harpreet for scheduling.

## 2024-11-19 NOTE — TELEPHONE ENCOUNTER
CC attempted to contact patient to discuss scheduling an appointment. LVM with direct number for a call back.    Harpreet Ruiz  Care Coordinator- [facility]  [phone]

## 2024-12-04 ENCOUNTER — OFFICE VISIT (OUTPATIENT)
Dept: FAMILY MEDICINE | Facility: CLINIC | Age: 49
End: 2024-12-04
Payer: COMMERCIAL

## 2024-12-04 VITALS
BODY MASS INDEX: 32.77 KG/M2 | DIASTOLIC BLOOD PRESSURE: 94 MMHG | SYSTOLIC BLOOD PRESSURE: 133 MMHG | RESPIRATION RATE: 16 BRPM | OXYGEN SATURATION: 98 % | HEIGHT: 63 IN | HEART RATE: 75 BPM

## 2024-12-04 DIAGNOSIS — F64.9 GENDER INCONGRUENCE: Primary | ICD-10-CM

## 2024-12-04 PROCEDURE — 99203 OFFICE O/P NEW LOW 30 MIN: CPT | Mod: GC

## 2024-12-04 NOTE — PROGRESS NOTES
HPI     Name and pronouns: Angel, she/her  Patient has primary care outside of [facility]? Yes  Seeking feminizing HRT  How referred to [facility]? By PCP      Problem List     Patient Active Problem List   Diagnosis    Anxiety and depression    Essential hypertension    Class 1 obesity without serious comorbidity with body mass index (BMI) of 34.0 to 34.9 in adult, unspecified obesity type    Diabetes mellitus, type 2 (H)    Encounter for HIV pre-exposure prophylaxis       Past Medical History     Past Medical History:   Diagnosis Date    Depressive disorder 2018    Diverticulosis of large intestine     History of colonic polyps     Hypertension 2019     Allergies   Allergen Reactions    No Known Allergies      Current Outpatient Medications   Medication Sig Dispense Refill    atorvastatin (LIPITOR) 20 MG tablet Take 1 tablet (20 mg) by mouth daily. 90 tablet 3    blood glucose (ACCU-CHEK GUIDE) test strip Use to test blood sugar 1 daily.  (OK to substitute alternate brand per insurance formulary.  If One Touch only give Verio not Ultra) 100 strip 11    blood glucose monitoring (NO BRAND SPECIFIED) meter device kit Use to test blood sugar as directed. Accu-chek 1 kit 0    blood glucose monitoring (SOFTCLIX) lancets Use to test blood sugar 1 times daily.  (OK to substitute alternate brand per insurance formulary.  If One Touch only give Verio not Ultra) 100 each 11    Continuous Glucose Sensor (FREESTYLE RICARDA 3 SENSOR) MISC 1 Device every 14 days. (If declined by insurance, will do durant pay with [employer] Voucher 2 for 75$) 2 each 11    famotidine (PEPCID) 20 MG tablet Take 20 mg by mouth.      lisinopril (ZESTRIL) 5 MG tablet Take 1 tablet (5 mg) by mouth daily. 90 tablet 3     History   Smoking Status    Never   Smokeless Tobacco    Never   No tobacco use. Alcohol use: 1 drink once every 2 weeks. No other recreational drug use.      Surgical History     Past Surgical History:   Procedure Laterality Date     COLONOSCOPY  2021      Family History   History of clots in family (DVT, PE)? No  History of breast cancer? Yes - per chart mother with breast cancer but Angel is unsure of this and also unsure of what age she could've been diagnosed  Family History   Problem Relation Age of Onset    Breast Cancer Mother     Hypertension Mother     Hyperlipidemia Mother     Mental Illness Mother     No Known Problems Father        Living environment, Safety     Social History     Socioeconomic History    Marital status:      Spouse name: None    Number of children: None    Years of education: None    Highest education level: None   Tobacco Use    Smoking status: Never    Smokeless tobacco: Never   Vaping Use    Vaping status: Never Used   Substance and Sexual Activity    Alcohol use: Yes     Comment: 0-1 drink a month    Drug use: Never    Sexual activity: Not Currently     Partners: Male   Social History Narrative    , Employed as admission counselor [employer]     Social Drivers of Health     Financial Resource Strain: Low Risk  (11/7/2024)    Financial Resource Strain     Within the past 12 months, have you or your family members you live with been unable to get utilities (heat, electricity) when it was really needed?: No   Food Insecurity: High Risk (11/7/2024)    Food Insecurity     Within the past 12 months, did you worry that your food would run out before you got money to buy more?: Yes     Within the past 12 months, did the food you bought just not last and you didn t have money to get more?: Yes   Transportation Needs: Low Risk  (11/7/2024)    Transportation Needs     Within the past 12 months, has lack of transportation kept you from medical appointments, getting your medicines, non-medical meetings or appointments, work, or from getting things that you need?: No   Physical Activity: Sufficiently Active (11/7/2024)    Exercise Vital Sign     Days of Exercise per Week: 5 days     Minutes of Exercise  "per Session: 40 min   Stress: Stress Concern Present (11/7/2024)    [employer] [employer] - Occupational Stress Questionnaire     Feeling of Stress : To some extent   Social Connections: Unknown (11/7/2024)    Social Connection and Isolation Panel [NHANES]     Frequency of Social Gatherings with Friends and Family: Three times a week   Interpersonal Safety: Low Risk  (11/12/2024)    Interpersonal Safety     Do you feel physically and emotionally safe where you currently live?: Yes     Within the past 12 months, have you been hit, slapped, kicked or otherwise physically hurt by someone?: No     Within the past 12 months, have you been humiliated or emotionally abused in other ways by your partner or ex-partner?: No   Housing Stability: Low Risk  (11/7/2024)    Housing Stability     Do you have housing? : Yes     Are you worried about losing your housing?: No      Is  but , on good terms, names  as one of her supports. Has not been sexually active for the past 6 years.  Works remotely at [employer], joined [employer] at work      Mental Health Assessment     Have you ever felt depressed because your gender identity and body don t match?  Yes, for the past 6 years but did not know why was depressed  Mental Health diagnosis history Depression (was taking Lexapro, now manages with therapy, exercise, hobbies)  Mental health hospitalizations No  Current suicidal thoughts?  No  Self harm   History in past   No   Current   No        7/25/2022     9:20 AM 11/7/2023     7:32 AM   PHQ-9 SCORE   PHQ-9 Total Score 1 15       Gender Journey   Gender identity   What words do you use to describe your gender identity? \"A girl\"  Where do you want your presentation to be? Long hair, perky breasts, vagina. Want to be a woman  What sex were you assigned at birth? male  Grew up in [place], [place] and was in the . Started \"figuring things out\" through TV or movies. Has always wanted to be a girl " "since childhood. Started to identify as \"prieto\" because this seemed more socially acceptable  Genitals doesn't work because pt does not like her penis, doesn't like to think about it  Decided to transition this year after Jg Ahmadi passed away - reports she didn't want to spend more time not being who she really is - \"I want to live my truth\"    Current body symptoms  What parts of your body or presentation make you feel uncomfortable or cause dysphoria?  Five o clock shadow, genitals    Have you ever seen a healthcare provider for gender-affirming medical interventions? No    Other types of supports you are using or want to start using:   Haircuts/style and gender affirmative clothing   Body or facial hair removal  Breast growth  Packing or tucking    Social supports  Who supports you for you? Group of 6 friends, patient reports that she really likes her doctor and is glad that he is supportive  Does not talk to blood relatives  Has  Parvez but is ,  is supportive    How do you spend your time? (Social activities, extracurricular activities, school, sports, volunteering, marbin based activities, community involvement)  Spending time with friends    How well is your gender identity accepted and supported in each of these environments?  Very well supported with friends. Has supportive coworker. Is honest and will not lie about gender expression when asked, but may not outwardly share information unless specifically asked    Embodiment goals in gender identity alignment  I would like these parts of my body to stay the same:   I am interested in changing these parts of my body or presentation: genitals, breasts, fat redistribution to hips/buttocks, less body hair, more hair on head  Other goals you have?   Wants bottom surgery         Review of Systems:   Review of Systems   ROS: 10 point ROS neg other than the symptoms noted above in the HPI.         Physical Exam:     Vitals:    12/04/24 1609 " "12/04/24 1610   BP: (!) 138/99 (!) 133/94   Pulse: 75    Resp: 16    SpO2: 98%    Height: 1.6 m (5' 3\")      Vital signs normal except hypertensive to 133/94  GENERAL: healthy, alert and no distress  RESP: normal respiratory effort  CV: normal rate  Psych: Alert and oriented times 3; coherent speech, normal rate and volume, able to articulate logical thoughts, able to abstract reason, no tangential thoughts, no hallucinations or delusions. Affect is normal.  Assessment and Plan      Angel is a 49 year old individual that uses pronouns She/Her/Hers/Herself that presents today for interest in feminizing treatment to better align their body with their gender identity.    Assessment & Plan   Gender incongruence  Patient meets criteria for gender dysphoria and has done very well with social transition already. Has significant social support (but not supported by family). Patient has a very clear idea of her goals, and would like to pursue feminizing therapy for goals of breast growth, less facial and body hair, and fat redistribution. Eventual goals include full depth vaginoplasty - sent referral today to at least start process of getting initial appt with plastic surgery as they are scheduling very far out. Today, reviewed medical history (patient has no contraindications to HRT), social history, sexual health, fertility/preservation options (patient does not want kids). For next visit, need to complete mental health history. Gave informed consent and HRT timeline to Angel to review prior to next appt.  - Comprehensive Gender Care Referral - Internal; Future    Return in about 3 weeks (around 12/25/2024) for Follow up, with me.     Role of Gender Support Clinic   Educated pt and family about consultant role in a residency clinic. Expect to establish with resident for ongoing primary care. If seeking gender care alone, will need to follow up with appointments only in Gender Support Clinic (Wednesday afternoons).    Not " all items are able to be addressed in an initial visit, as this is the first visit in a comprehensive assessment. Our team will address the following items in subsequent visits if not able to be addressed today:  Relationship status and safety  Sexual orientation  Sexual health status and concerns   Fertility issues and preservation options  Chemical use history and needs  Future conversations will help establish the adolescent's sustained identity experience over time, and we will consider evolving sociological characterizations of gender such as consideration of gender fluidity, dimensionality and cultural implications/definitions of gender roles.    Maturity is a significant factor in the ability of adolescents to give their assent to gender affirmative medical interventions. We will need to assess their decision making capacity and ability to understand abstract reasoning, which takes more than one visit.      This patient meets DSM V Criteria for gender dysphoria in adults and adolescents.

## 2024-12-04 NOTE — PATIENT INSTRUCTIONS
Informed Consent Form for Feminizing Medications    This form refers to the use of estrogen, progesterone, and/or androgen antagonists (sometimes called  testosterone suppressant ,  anti-androgens  or  androgen-blockers) by persons who wish to feminize their body. While there are risks associated with taking feminizing medications, when appropriately prescribed they can improve mental health and quality of life. Please seek another opinion if you want additional perspective on any aspect of your care.    Feminizing Effects      I understand that estrogen, progesterone, androgen antagonists, or a combination may be prescribed to feminize my body:  Skin may become softer  Muscle mass may decrease  Body hair may decrease  Fat may redistribute from abdomen to buttocks and thighs    2.     I understand that feminizing effects of estrogen and androgen antagonists can take several months to a year to become noticable, speed and degree of change is unpredictable, and is different for every person     3.     I understand that if I am taking estrogen I will probably develop breasts, and:   Breasts may take several years to develop to their full size.  Even if estrogen is stopped, the breast tissue that has developed will remain.  As soon as breasts start growing, it is recommended to have an annual brest exam.  There may be milky nipple discharge (galactorrhea). If you develop this, it is advised to check with a doctor to determine the cause.  It is not known if taking estrogen increases the risk of breast cancer.     4.    I understand that the following changes may occur if I stop taking feminizing medications:     Skin may become rougher   Muscle mass increases and there may be an increase in upper body strength  Body hair growth may become more noticable and grow more quickly   Male pattern baldness may develop more quickly, and hair that has already been lost will likely not grow back.  Fat may redistribute back to a  more masculine pattern (increased in abdomen, decreased on buttocks/hips/thighs)    5.    I understand that taking feminizing medications will make my testicles produce less testosterone, which can affect my overall sexual function:  Fertility may be impaired, I may become sterile, and going off feminizing medications may not bring back my ability to have biological children. I should consider sperm banking if I desire biological children.  Sperm may not mature, leading to reduced fertility. However, it is still possible to get someone pregnant and contraception should be used if needed.  Testicles may shrink by 25-50%. Testicular examinations are still recommended in young people to check for masses.  The amount of fluid ejactulated may be reduced.  There is typically a decrease in morning and spontaneous erections.  Erections may not be firm enough for penetrative sex.   Libido (sex drive) may decrease.    6.     I understand that there are some aspects of my body that are not significantly changed by feminizing medications  Beard/facial hair may grow more slowly and be less noticeable, but will not go away.  Voice pitch will not rise and speech patterns will not become more feminine (for example, feminine patterns of enunciation, body language, and phonation).   The laryngeal prominence ( Benjamin s apple ) will not shrink.    7.     I understand that there are other non-medical options that can be helpful for presenting more feminine, and I may ask for additional resources about these.  Breast forms  Packers and tucking  Speech therapy for adopting feminine vocal patterns  Hair removal     Risks of Feminizing Medications    I understand that the medical effects and safety of feminizing medications are not fully understood, and that there may be long-term risks that are not yet known.     I understand that I am strongly advised not to take more medications than I am prescribed, as this increases health risks. It  won t help changes come faster or increase the degree of changes.    I understand that feminizing medications can damage the liver. I have been advised that I should be monitored for possible liver damage as long as I am taking feminizing medications.     I understand that feminizing medications will result in changes that will be noticeable by other people, and that some people in similar circumstances have experienced harassment, discrimination, and violence, while others have lost support of loved ones. I have been advised that referrals can be made for support/counseling if this would be helpful.     I understand that taking estrogen increases the risk of blood clots, which can result in:   pulmonary embolism (blood clot to the lungs), which may cause death  stroke, which may cause permanent brain damage or death  heart attack  chronic leg vein problems    6.   I understand that the risk of blood clots is much worse if I smoke cigarettes, especially over age 40. I understand that the danger is so high that I should stop smoking completely if I start taking estrogen. I can ask my doctor for advice on quitting.    7.   I understand that taking estrogen can increase deposits of fat around my internal organs, which is associated with increased risk for diabetes and heart disease.    8.   I understand that taking estrogen can cause increased blood pressure, estrogen increases the risk of gallstones, estrogen can cause headaches or migraines and can cause nausea and vomiting. I have been advised that if I develop these, it is recommended that I discuss this with my doctor.    9.   I understand that it is not known if taking estrogen increases the risk of non-cancerous tumors of the pituitary gland in the brain. I have been informed that although this is typically not life-threatening, it can damage vision. I understand that this will be monitored.    10. I have been informed that I am more likely to have dangerous  side effects from estrogen if I smoke, I carry extra weight, am over 40 years old, or  have a history of blood clots, high blood pressure, or a family history of breast cancer.    11. I have been informed that if I take too much estrogen, my body may convert it into testosterone, which may slow or stop feminization.    Medical Risks Associated with Androgen Blockers    1.   I have been informed that spironolactone (a testosterone suppressant) affects the balance of water and salts in the kidneys, and that this may:  Increase the amount of urine produced, and I may urinate more frequently for about 2 weeks  Reduce blood pressure  Rarely, cause high levels of potassium in the blood, and this will be monitored    2.   I understand that some androgen antagonists make it more difficult to evaluate the results of PSA (prostate) test. If I am over 50, I should have my prostate evaluated every year.    Prevention of Medical Complications    1.   I agree to take feminizing medications as prescribed and to tell my care provider if I am not happy with the treatment or am experiencing any problems.    2.   I understand that the right dose or type of medication prescribed for me may not be the same as for someone else.    3.   I understand that physical examinations and blood tests are needed on a regular basis (monthly, at first) to check for negative side effects of feminizing medications.    4.   I understand that feminization medications can interact with other medication (including other sources of hormones), dietary supplements, herbs, alcohol, and street drugs. I understand that being honest with my care provider about what else I am taking will help prevent medical complications that could be life-threatening. I have been informed that I will continue to get medical care no matter what information I share, and health care providers cannot legally disclose information about a patient s drug use to law enforcement.    5.    I understand that some medical conditions make it dangerous to take estrogen or androgen antagonists. I agree to be checked for risky conditions before the decision to start or continue feminizing medication is made.    6.   I understand that I can choose to stop taking feminizing medication at any time, and that it is advised that I do this with the help of my doctor to make sure there are no negative reactions to stopping. I understand that my doctor may suggest I reduce, switch or stop taking feminizing medication, if there are severe health risks that can t be controlled.     Effects of Feminizing Hormone Therapy: When They Happen      * If you are on feminizing hormones, your body is still able to make sperm, so your partner could still get pregnant. Feminizing hormones are not birth control. Some people choose to stop feminizing hormones with the goal of having a baby. You may then choose to go back to feminizing hormones therapy at any time. The effect of feminizing therapy on the amount and quality of sperm varies and is unknown.   **Electrolysis, laser treatments, or both can remove facial, armpit, and pubic hair. In most cases, these changes are permanent.

## 2024-12-09 ENCOUNTER — TELEPHONE (OUTPATIENT)
Dept: PLASTIC SURGERY | Facility: CLINIC | Age: 49
End: 2024-12-09
Payer: COMMERCIAL

## 2024-12-09 NOTE — TELEPHONE ENCOUNTER
LVM with callback number. Referral stated Angel is interested in setting up consult for bottom surgery.

## 2024-12-18 ENCOUNTER — TELEPHONE (OUTPATIENT)
Dept: PLASTIC SURGERY | Facility: CLINIC | Age: 49
End: 2024-12-18
Payer: COMMERCIAL

## 2024-12-18 NOTE — CONFIDENTIAL NOTE
[facility] :  Care Coordination Note     SITUATION   Angel Mohr (she/her) is a 49 year old adult who is receiving support for:  Care Team  .    BACKGROUND     Pt is scheduled for a full depth vaginoplasty consult with Maggie Lagunas on 2/20/25 (virtual).    ASSESSMENT     Surgery              CGC Assessment  Comprehensive Gender Care (CGC) Enrollment: Enrolled  Patient has a therapist: (P) Yes  Name of therapist: (P) Lisbeth Peres  Letter of support #1: (P) Requested  Letter of support #2: (P) Requested  Surgery being considered: (P) Yes  Vaginoplasty: (P) Yes    Pt reports:   No nicotine, other gender affirming surgeries, or HRT  Has intake appt for HRT on 12/26  PLAN          Nursing Interventions:       Follow-up plan:  1. Start hair removal  2. Obtain JEREMIE Estrada

## 2024-12-19 NOTE — TELEPHONE ENCOUNTER
FUTURE VISIT INFORMATION      FUTURE VISIT INFORMATION:  Date: 2/20/24  Time: 10:00am  Location: [facility] [place]  REFERRAL INFORMATION:  Reason for visit/diagnosis  full depth vaginoplasty     RECORDS REQUESTED FROM:       No recs to collect

## 2024-12-26 ENCOUNTER — OFFICE VISIT (OUTPATIENT)
Dept: FAMILY MEDICINE | Facility: CLINIC | Age: 49
End: 2024-12-26
Payer: COMMERCIAL

## 2024-12-26 VITALS
HEART RATE: 81 BPM | DIASTOLIC BLOOD PRESSURE: 90 MMHG | OXYGEN SATURATION: 99 % | RESPIRATION RATE: 17 BRPM | HEIGHT: 63 IN | WEIGHT: 184.4 LBS | SYSTOLIC BLOOD PRESSURE: 127 MMHG | BODY MASS INDEX: 32.67 KG/M2 | TEMPERATURE: 98.1 F

## 2024-12-26 DIAGNOSIS — F64.0 GENDER DYSPHORIA IN ADULT: Primary | ICD-10-CM

## 2024-12-26 DIAGNOSIS — Z23 ENCOUNTER FOR IMMUNIZATION: ICD-10-CM

## 2024-12-26 LAB
ALBUMIN SERPL BCG-MCNC: 4.3 G/DL (ref 3.5–5.2)
ALP SERPL-CCNC: 74 U/L (ref 40–150)
ALT SERPL W P-5'-P-CCNC: 36 U/L (ref 0–70)
ANION GAP SERPL CALCULATED.3IONS-SCNC: 9 MMOL/L (ref 7–15)
AST SERPL W P-5'-P-CCNC: 31 U/L (ref 0–45)
BILIRUB SERPL-MCNC: 0.4 MG/DL
BUN SERPL-MCNC: 11.8 MG/DL (ref 6–20)
CALCIUM SERPL-MCNC: 9.2 MG/DL (ref 8.8–10.4)
CHLORIDE SERPL-SCNC: 106 MMOL/L (ref 98–107)
CREAT SERPL-MCNC: 1.29 MG/DL (ref 0.51–1.17)
EGFRCR SERPLBLD CKD-EPI 2021: 67 ML/MIN/1.73M2
GLUCOSE SERPL-MCNC: 121 MG/DL (ref 70–99)
HCO3 SERPL-SCNC: 26 MMOL/L (ref 22–29)
HGB BLD-MCNC: 13.6 G/DL (ref 11.7–17.7)
POTASSIUM SERPL-SCNC: 4.2 MMOL/L (ref 3.4–5.3)
PROT SERPL-MCNC: 7.1 G/DL (ref 6.4–8.3)
SODIUM SERPL-SCNC: 141 MMOL/L (ref 135–145)

## 2024-12-26 NOTE — PROGRESS NOTES
[facility]  New Patient History and Physical, Gender-Nonconforming Patient        Social Supports      Who supports you for you?   Very well supported with friends. Has supportive coworker. Is honest and will not lie about gender expression when asked, but may not outwardly share information unless specifically asked   Are you out at work, school or home?   Yes: at home, not at work. New job, still learning lay of land.      Social History     Living environment  Who lives in your household?   No children. Alone. [employer].   Has anyone hurt you physically, for example by pushing, hitting, slapping or kicking you or forcing you to have sex?   Denies  Do you feel threatened or controlled by a partner, ex-partner or anyone in your life?   Denies    Relationships  How do you describe your sexual or romantic orientation?     Romantic or sexual partners include:   cismen  transmen : anyone masculine appearing  Current partners number:   0  Fertility plans? No Interest in cryopreservation? No   Contraception? not needed      Social History     Socioeconomic History    Marital status:      Spouse name: None    Number of children: None    Years of education: None    Highest education level: None   Tobacco Use    Smoking status: Never    Smokeless tobacco: Never   Vaping Use    Vaping status: Never Used   Substance and Sexual Activity    Alcohol use: Yes     Comment: 0-1 drink a month    Drug use: Never    Sexual activity: Not Currently     Partners: Male   Social History Narrative    , Employed as admission counselor [employer]     Social Drivers of Health     Financial Resource Strain: Low Risk  (11/7/2024)    Financial Resource Strain     Within the past 12 months, have you or your family members you live with been unable to get utilities (heat, electricity) when it was really needed?: No   Food Insecurity: High Risk (11/7/2024)    Food Insecurity      Within the past 12 months, did you worry that your food would run out before you got money to buy more?: Yes     Within the past 12 months, did the food you bought just not last and you didn t have money to get more?: Yes   Transportation Needs: Low Risk  (11/7/2024)    Transportation Needs     Within the past 12 months, has lack of transportation kept you from medical appointments, getting your medicines, non-medical meetings or appointments, work, or from getting things that you need?: No   Physical Activity: Sufficiently Active (11/7/2024)    Exercise Vital Sign     Days of Exercise per Week: 5 days     Minutes of Exercise per Session: 40 min   Stress: Stress Concern Present (11/7/2024)    [place] [employer] - Occupational Stress Questionnaire     Feeling of Stress : To some extent   Social Connections: Unknown (11/7/2024)    Social Connection and Isolation Panel [NHANES]     Frequency of Social Gatherings with Friends and Family: Three times a week   Interpersonal Safety: Low Risk  (11/12/2024)    Interpersonal Safety     Do you feel physically and emotionally safe where you currently live?: Yes     Within the past 12 months, have you been hit, slapped, kicked or otherwise physically hurt by someone?: No     Within the past 12 months, have you been humiliated or emotionally abused in other ways by your partner or ex-partner?: No   Housing Stability: Low Risk  (11/7/2024)    Housing Stability     Do you have housing? : Yes     Are you worried about losing your housing?: No         Sexual Health     Are there sexual health or intimacy concerns with the initiation of hormone therapy?  No  STI History:   Pos: chlamydia, treated, 40 years ago  Do you want STI screening today?   No      Mental Health Assessment     Have you ever felt depressed because your gender identity and body don t match? Yes: major  of depression. Has felt relief exploring options.  Chemical use history:  "No  Mental Health diagnosis history  Depression  Anxiety  Mental health hospitalizations: No  History of suicide attempts? No  Current suicidal thoughts?  YES, no plan, during covid, felt overwhelmed and \"drowning.\" Reached out for help and started on medication.  Self harm  Past: No   Current: No  Non gender related Therapist? Yes: Lisbeth Wolf - works with [employer]  Gender therapist? Yes, same as above    - Anxiety  - Depression       7/25/2022     9:20 AM 11/7/2023     7:32 AM   PHQ-9 SCORE   PHQ-9 Total Score 1 15          No data to display                Medications prescribed for above and by whom? None currently  YARELIS sent to:  Given         7/25/2022     9:20 AM 11/7/2023     7:32 AM   PHQ   PHQ-9 Total Score 1 15   Q9: Thoughts of better off dead/self-harm past 2 weeks Not at all Nearly every day       Angel is a 49 year old individual that uses She/Her/Hers/Herself pronouns presents today for interest in feminizing treatment to better align their body with their gender identity. This patient came to this clinic via referral from: PCP    Diagnoses and all orders for this visit:  Gender dysphoria in adult  Completed remainder of HRT intake and obtained baseline labs today.  No contraindications to HRT per review of complete HPI intake questionnaire.  Discussed next steps including review of HRT medications in detail in addition to completing consent form.  Patient expresses preference to avoid needles.  Will discuss further at next visit.  Patient to schedule next available appointment with me or Dr. Larson for continued HRT management.  -     Hemoglobin; Future  -     Comprehensive metabolic panel; Future    Encounter for immunization  Patient due for routine immunization. Risks and benefits of immunization discussed. Patient agreeable to proceed with immunizations today.  -     INFLUENZA VACCINE,SPLIT VIRUS,TRIVALENT,PF(FLUZONE)  -     COVID-19 12+ (PFIZER)      Educated about 3 parts of " evaluation before starting HRT  Physical health  Mental health  Informed consent    Medical safety for hormones  Lacks contraindications to hormonal therapy    Next labs and exam    Baseline completed today.  Next labs following initiation of HRT    Counseling completed today  Counselled patient about controlled substances, never share: Yes  Contraception: not needed  Educated about testosterone as absolute contraindication in pregnancy: N/A  Fertility plan if starts cross-sex hormones: none, not interested    Mental health assessment  Completed by Barry Jade DO today  Diagnoses are stable and/or are likely to become stabilized by treatment of gender dysphoria    Informed consent process  Yes --- Is able to provide informed consent   Yes --- Likely to take hormones in a responsible manner  No --- Discussed physical effects, benefits, and risk assessment & modification  Yes --- Discussed the clinical and biochemical monitoring of hormonal changes and the potential impact on reproductive health & fertility      We discussed thoroughly the risks/benefits/alternatives of this treatment. Questions elicited and answered in reviewing the informed consent document.  Pt is fully prepared to start hormones and is able to reason through risks and management. Questions elicited and answered and patient verbally consents to hormone treatment.  (This assessment is based on the 2011 published Standards of Care for the Health of Transsexual, Transgender, and Gender-Nonconforming People, Version 7, by the [employer] of Transgender Health. WPATH SOC Guidelines)    Barry Jade DO

## 2025-01-14 ENCOUNTER — OFFICE VISIT (OUTPATIENT)
Dept: FAMILY MEDICINE | Facility: CLINIC | Age: 50
End: 2025-01-14
Payer: COMMERCIAL

## 2025-01-14 VITALS
DIASTOLIC BLOOD PRESSURE: 84 MMHG | TEMPERATURE: 98 F | HEART RATE: 108 BPM | BODY MASS INDEX: 32.43 KG/M2 | SYSTOLIC BLOOD PRESSURE: 132 MMHG | RESPIRATION RATE: 20 BRPM | WEIGHT: 183 LBS | HEIGHT: 63 IN | OXYGEN SATURATION: 98 %

## 2025-01-14 DIAGNOSIS — F64.0 GENDER DYSPHORIA IN ADULT: Primary | ICD-10-CM

## 2025-01-14 PROBLEM — R13.10 DYSPHAGIA: Status: ACTIVE | Noted: 2025-01-14

## 2025-01-14 PROBLEM — K21.9 LARYNGOPHARYNGEAL REFLUX (LPR): Status: ACTIVE | Noted: 2025-01-14

## 2025-01-14 RX ORDER — ESTRADIOL 0.05 MG/D
1 PATCH TRANSDERMAL WEEKLY
Qty: 5 PATCH | Refills: 0 | Status: SHIPPED | OUTPATIENT
Start: 2025-01-14

## 2025-01-14 NOTE — PROGRESS NOTES
Assessment & Plan   Angel is a 49 year old individual that uses She/Her/Hers/Herself pronouns presents today for interest in feminizing treatment to better align their body with their gender identity. This patient came to this clinic via referral from: PCP    Diagnoses and all orders for this visit:  Gender dysphoria in adult  Patient has previously completed full intake process, including assessment of physical and mental health.  Patient has no contraindications to starting HRT.  Following full discussion of risks, benefits, and available formulations, patient expresses interest in starting estradiol patches as preferred form of HRT.  Prescription provided today.  Plan to follow-up in 1 month for ongoing HRT evaluation, recommend total testosterone, estradiol, hemoglobin, lipids for lab follow-up in 3 months.  -     estradiol (CLIMARA) 0.05 MG/24HR weekly patch; Place 1 patch over 168 hours onto the skin once a week.      Educated about 3 parts of evaluation before starting HRT  Physical health  Mental health  Informed consent    Medical safety for hormones  Lacks contraindications to hormonal therapy  YARELIS for records sent for psychiatry provider, will need to be reviewed by: me  Medication plan: feminizing hormone therapy with estrogen  Administration route:  patch    Next labs and exam    Recommended follow up:  Initiation: follow up in 1 month, labs in 3 months  Follow up w/ Chintan Marinelli  as needed. Note sent to PCP.     Counseling completed today  Counselled patient about controlled substances, never share: Yes  Contraception: not needed  Fertility plan if starts cross-sex hormones: none, not interested  Plan nurse visit for shot teaching once medication is in hand     Mental health assessment  Completed by Barry Jade DO 12/26/2024  Diagnoses are stable and/or are likely to become stabilized by treatment of gender dysphoria  YARELIS form sent to patient's mental health provider    Informed  "consent process  Yes --- Is able to provide informed consent   Yes --- Likely to take hormones in a responsible manner  Yes --- Discussed physical effects, benefits, and risk assessment & modification  Yes --- Discussed the clinical and biochemical monitoring of hormonal changes and the potential impact on reproductive health & fertility      We discussed thoroughly the risks/benefits/alternatives of this treatment. Questions elicited and answered in reviewing the informed consent document.  Pt is fully prepared to start hormones and is able to reason through risks and management. Questions elicited and answered and patient verbally consents to hormone treatment.  (This assessment is based on the 2011 published Standards of Care for the Health of Transsexual, Transgender, and Gender-Nonconforming People, Version 7, by the World [employer] Association of Transgender Health. WPATH SOC Guidelines)    Barry Jade DO      Return in about 1 month (around 2/14/2025) for HRT follow up.    Subjective   Angel is a 49 year old, presenting for the following health issues:  RECHECK (HRT)        1/14/2025     9:08 AM   Additional Questions   Roomed by Doua   Accompanied by Self         1/14/2025     9:08 AM   Patient Reported Additional Medications   Patient reports taking the following new medications n/a         1/14/2025    Information    services provided? No     HPI   Patient here to discuss the following:    HRT:  - Here for discussion of HRT options  - Intake forms completed at last visit  - Excited for this \"life changing\" next step      Objective    /84   Pulse 108   Temp 98  F (36.7  C) (Oral)   Resp 20   Ht 1.6 m (5' 3\")   Wt 83 kg (183 lb)   SpO2 98%   BMI 32.42 kg/m    Body mass index is 32.42 kg/m .  Physical Exam  Vitals reviewed.   Constitutional:       General: She is not in acute distress.     Appearance: Normal appearance.   HENT:      Head: Normocephalic and " atraumatic.   Pulmonary:      Effort: Pulmonary effort is normal. No respiratory distress.   Skin:     General: Skin is warm and dry.   Neurological:      Mental Status: She is alert. Mental status is at baseline.   Psychiatric:         Mood and Affect: Mood normal.         Behavior: Behavior normal.         Thought Content: Thought content normal.         Judgment: Judgment normal.                Signed Electronically by: Barry Jade DO

## 2025-01-14 NOTE — PROGRESS NOTES
Preceptor Attestation:   Patient seen, evaluated and discussed with the resident. I have verified the content of the note, which accurately reflects my assessment of the patient and the plan of care.   Supervising Physician:  David Dhaliwal MD

## 2025-02-07 ENCOUNTER — MYC MEDICAL ADVICE (OUTPATIENT)
Dept: FAMILY MEDICINE | Facility: CLINIC | Age: 50
End: 2025-02-07
Payer: COMMERCIAL

## 2025-02-07 DIAGNOSIS — F64.0 GENDER DYSPHORIA IN ADULT: ICD-10-CM

## 2025-02-07 NOTE — TELEPHONE ENCOUNTER
"RN queued up medication and pharmacy, sending to provider for review.    Pt requesting bridge until appt on 2/18    Last seen 1/14/25    Request for medication refill:    estradiol (CLIMARA) 0.05 MG/24HR weekly patch     Providers if patient needs an appointment and you are willing to give a one month supply please refill for one month and  send a letter/MyChart using \".SMILLIMITEDREFILL\" .smillimited and route chart to \"P SMI \" (Giving one month refill in non controlled medications is strongly recommended before denial)    If refill has been denied, meaning absolutely no refills without visit, please complete the smart phrase \".smirxrefuse\" and route it to the \"P SMI MED REFILLS\"  pool to inform the patient and the pharmacy.    Gemini Zapata, RN    "

## 2025-02-08 DIAGNOSIS — F64.0 GENDER DYSPHORIA IN ADULT: ICD-10-CM

## 2025-02-08 RX ORDER — ESTRADIOL 0.05 MG/D
1 PATCH TRANSDERMAL WEEKLY
Qty: 5 PATCH | Refills: 0 | Status: SHIPPED | OUTPATIENT
Start: 2025-02-08

## 2025-02-10 RX ORDER — ESTRADIOL 0.05 MG/D
PATCH TRANSDERMAL
Qty: 4 PATCH | OUTPATIENT
Start: 2025-02-10

## 2025-02-10 NOTE — TELEPHONE ENCOUNTER
Duplicate request for estradiol (CLIMARA) 0.05 MG/24HR weekly patch script sent 2/8/25    Gemini Zapata RN

## 2025-02-11 ENCOUNTER — MYC REFILL (OUTPATIENT)
Dept: FAMILY MEDICINE | Facility: CLINIC | Age: 50
End: 2025-02-11
Payer: COMMERCIAL

## 2025-02-11 DIAGNOSIS — E78.5 HYPERLIPIDEMIA LDL GOAL <100: ICD-10-CM

## 2025-02-11 DIAGNOSIS — I10 ESSENTIAL HYPERTENSION: ICD-10-CM

## 2025-02-11 RX ORDER — ATORVASTATIN CALCIUM 20 MG/1
20 TABLET, FILM COATED ORAL DAILY
Qty: 90 TABLET | Refills: 2 | Status: SHIPPED | OUTPATIENT
Start: 2025-02-11

## 2025-02-11 RX ORDER — LISINOPRIL 5 MG/1
5 TABLET ORAL DAILY
Qty: 90 TABLET | Refills: 2 | Status: SHIPPED | OUTPATIENT
Start: 2025-02-11

## 2025-02-16 ENCOUNTER — HEALTH MAINTENANCE LETTER (OUTPATIENT)
Age: 50
End: 2025-02-16

## 2025-02-18 ENCOUNTER — OFFICE VISIT (OUTPATIENT)
Dept: FAMILY MEDICINE | Facility: CLINIC | Age: 50
End: 2025-02-18
Payer: COMMERCIAL

## 2025-02-18 VITALS
TEMPERATURE: 98.2 F | DIASTOLIC BLOOD PRESSURE: 89 MMHG | RESPIRATION RATE: 16 BRPM | BODY MASS INDEX: 32.42 KG/M2 | HEIGHT: 63 IN | OXYGEN SATURATION: 97 % | SYSTOLIC BLOOD PRESSURE: 128 MMHG | HEART RATE: 85 BPM

## 2025-02-18 DIAGNOSIS — F64.0 GENDER DYSPHORIA IN ADULT: Primary | ICD-10-CM

## 2025-02-18 RX ORDER — ESTRADIOL 0.1 MG/D
1 PATCH TRANSDERMAL WEEKLY
Qty: 4 PATCH | Refills: 3 | Status: SHIPPED | OUTPATIENT
Start: 2025-02-18

## 2025-02-18 ASSESSMENT — PAIN SCALES - GENERAL: PAINLEVEL_OUTOF10: NO PAIN (0)

## 2025-02-18 NOTE — PROGRESS NOTES
"  Assessment & Plan     Gender dysphoria in adult  Patient is a 49-year-old transgender female who was started on HRT 1 month ago with 0.05 mg per 24-hour estradiol patch.  Patient reports she has been tolerating the medication well without side effects.  Plan to increase dose today to 0.1 mg per 24 hours.  Patient to return in 2 months for HRT follow-up with labs (total testosterone, estradiol, hemoglobin, lipids).  - estradiol (CLIMARA) 0.1 MG/24HR weekly patch  Dispense: 4 patch; Refill: 3      Return in about 2 months (around 4/18/2025) for HRT follow up with lab.    Subjective   Angle is a 49 year old, presenting for the following health issues:  RECHECK (Follow-up)        2/18/2025     8:58 AM   Additional Questions   Roomed by Syd   Accompanied by Self         2/18/2025   Declines Weight   Did patient decline having their weight taken? Yes         2/18/2025    Information    services provided? No     HPI   Patient here to discuss the following:    HRT follow up:  - On 0.05 mg/24hr weekly Climara E patches for 1 month  - Going well, no concerns  - No issues with falling off early or adhesive  - Feeling like hair is softer, breasts felipe  - Wondering about increasing dose    Objective    /89 (BP Location: Left arm, Patient Position: Sitting, Cuff Size: Adult Large)   Pulse 85   Temp 98.2  F (36.8  C) (Temporal)   Resp 16   Ht 1.6 m (5' 3\")   SpO2 97%   BMI 32.42 kg/m    Body mass index is 32.42 kg/m .  Physical Exam  Vitals reviewed.   Constitutional:       General: She is not in acute distress.     Appearance: Normal appearance.   HENT:      Head: Normocephalic and atraumatic.   Pulmonary:      Effort: Pulmonary effort is normal. No respiratory distress.   Skin:     General: Skin is warm and dry.   Neurological:      Mental Status: She is alert. Mental status is at baseline.   Psychiatric:         Mood and Affect: Mood normal.         Behavior: Behavior normal.         " Thought Content: Thought content normal.         Judgment: Judgment normal.              Signed Electronically by: Barry Jade DO

## 2025-02-20 ENCOUNTER — PRE VISIT (OUTPATIENT)
Dept: PLASTIC SURGERY | Facility: CLINIC | Age: 50
End: 2025-02-20

## 2025-05-18 ENCOUNTER — HEALTH MAINTENANCE LETTER (OUTPATIENT)
Age: 50
End: 2025-05-18

## 2025-05-31 DIAGNOSIS — F64.0 GENDER DYSPHORIA IN ADULT: ICD-10-CM

## 2025-06-03 RX ORDER — ESTRADIOL 0.1 MG/D
PATCH TRANSDERMAL
Qty: 6 PATCH | Refills: 0 | Status: SHIPPED | OUTPATIENT
Start: 2025-06-03

## 2025-06-03 NOTE — TELEPHONE ENCOUNTER
"Request for medication refill:    Medication Name:  estradiol (CLIMARA) 0.1 MG/24HR weekly patch     Providers if patient needs an appointment and you are willing to give a one month supply please refill for one month and  send a MyChart using \".[email or web address]\" .Or route chart to \"P [facility] \" . To call patient and inform of limited refill and providers request to make an appointment. (Giving one month refill in non controlled medications is strongly recommended before denial)    If refill has been denied, meaning absolutely no refills without visit, please complete the smart phrase \".SMIRXREFUSE\" and route it to the \"P [facility] MED REFILLS\"  pool to inform the patient and the pharmacy.    Syd Suresh MA      "

## 2025-07-28 ENCOUNTER — OFFICE VISIT (OUTPATIENT)
Dept: FAMILY MEDICINE | Facility: CLINIC | Age: 50
End: 2025-07-28
Payer: COMMERCIAL

## 2025-07-28 VITALS
WEIGHT: 170.6 LBS | TEMPERATURE: 98.2 F | SYSTOLIC BLOOD PRESSURE: 137 MMHG | HEIGHT: 63 IN | DIASTOLIC BLOOD PRESSURE: 97 MMHG | OXYGEN SATURATION: 99 % | HEART RATE: 76 BPM | BODY MASS INDEX: 30.23 KG/M2 | RESPIRATION RATE: 16 BRPM

## 2025-07-28 DIAGNOSIS — F64.0 GENDER DYSPHORIA IN ADULT: Primary | ICD-10-CM

## 2025-07-28 DIAGNOSIS — Z11.3 SCREENING FOR STDS (SEXUALLY TRANSMITTED DISEASES): ICD-10-CM

## 2025-07-28 DIAGNOSIS — E11.9 TYPE 2 DIABETES MELLITUS WITHOUT COMPLICATION, WITHOUT LONG-TERM CURRENT USE OF INSULIN (H): ICD-10-CM

## 2025-07-28 RX ORDER — ESTRADIOL 0.1 MG/D
PATCH TRANSDERMAL WEEKLY
Qty: 6 PATCH | Refills: 0 | Status: SHIPPED | OUTPATIENT
Start: 2025-07-28

## 2025-07-28 NOTE — PROGRESS NOTES
Assessment & Plan     Gender dysphoria in adult  On GAHT (Gender Affirming Hormone treatment) since 1/14/2025, currently on Estradiol patch (Climara) 0.1 mg/weekly, applied on Monday.   Medication changes today? Yes, consider increasing dose of Estradiol patch, starting Spironolactone 100 mg daily pending updated lab results  Follow up schedule: 3 months until at stable dose    Angel engaged in the decision making process and verbalized understanding of the options discussed and agreed with the final plan. All coexisting medical and mental health conditions that could interfere with treatment have been addressed and are reasonably controlled at this time. They have capacity to weigh risks and benefits and understand consequences of treatment and non-treatment. They have previously been counseled on long term impacts of fertility, and offered resources on fertility preservation during prior visit(s).   - Testosterone total  - Estradiol  - Lipid panel reflex to direct LDL Non-fasting  - estradiol (CLIMARA) 0.1 MG/24HR weekly patch  Dispense: 6 patch; Refill: 0    Screening for STDs (sexually transmitted diseases)  Patient not currently sexually active but would like updated STI screening. Would also like to start PrEP. Will prescribe pending negative HIV test.  - Treponema Abs w Reflex to RPR and Titer  - HIV Antigen Antibody Combo  - Chlamydia trachomatis/Neisseria gonorrhoeae by PCR - Clinic Collect  - Chlamydia trachomatis/Neisseria gonorrhoeae by PCR - Clinic Collect  - Chlamydia trachomatis/Neisseria gonorrhoeae by PCR - Clinic Collect    Type 2 diabetes mellitus without complication, without long-term current use of insulin (H)  Follow up with PCP for ongoing management.   - HEMOGLOBIN A1C      The longitudinal plan of care for the diagnosis(es)/condition(s) as documented were addressed during this visit. Due to the added complexity in care, I will continue to support Angel in the subsequent management and  with ongoing continuity of care.      Follow-up  Return in about 3 months (around 10/28/2025) for HRT, PrEP.    Subjective   Angel is a 49 year old, presenting for the following health issues:  HRT and Refill Request (Discuss stronger dose for medication refill)        7/28/2025     7:58 AM   Additional Questions   Roomed by Pa   Accompanied by Self         7/28/2025    Information    services provided? No     HPI    Patient here to discuss the following:    HRT:  Angel is a 49 year old individual that uses pronouns She/Her/Hers/Herself that presents today for follow up of gender affirming hormone therapy for gender affirmation.     GAHT history   Started GAHT: 1/14/2025  Last visit: 2/18/2025  Last labs: 12/26/2024    S/p gonadectomy? No    Embodiment goals: Less hair (specifically chest hair), increased dose of estradiol, starting spirinolactone    Interval history  Currently taking: Estradiol patch (Climara) 0.1 mg/weekly. Put patches on Mondays.   Missed doses?  None    Overall things are going: Good  What changes have you noticed?   Fat redistribution: No  Change in libido or sexual experience: Yes; increased  Genital changes: No  Genital pain: No  Hot flashes:  No  Mood swings: Yes  Breast development: Yes  Decreased spontaneous erections: Yes  Decrease in body hair: No  Chest Pains, shortness of breath, leg swelling?: No  Headache: No  Right upper abdominal pain: No      Pace of changes:  acceptable   Unmet embodiment goals?  Yes; see above  Any changes you are not liking? No    Sexual Health  - New sexual partners: No  - Contraception:  No  - Desires STI screening? Yes  - Interested in PrEP, Doxy PEP?  Yes     Social history  Smoking? No    Mental Health        7/25/2022     9:20 AM 11/7/2023     7:32 AM   PHQ   PHQ-9 Total Score 1 15   Q9: Thoughts of better off dead/self-harm past 2 weeks Not at all Nearly every day          No data to display                          Objective    BP  "(!) 137/97   Pulse 76   Temp 98.2  F (36.8  C) (Temporal)   Resp 16   Ht 1.6 m (5' 3\")   Wt 77.4 kg (170 lb 9.6 oz)   SpO2 99%   BMI 30.22 kg/m    Body mass index is 30.22 kg/m .  Physical Exam  Vitals reviewed.   Constitutional:       General: She is not in acute distress.     Appearance: Normal appearance.   HENT:      Head: Normocephalic and atraumatic.   Pulmonary:      Effort: Pulmonary effort is normal. No respiratory distress.   Skin:     General: Skin is warm and dry.   Neurological:      Mental Status: She is alert. Mental status is at baseline.   Psychiatric:         Mood and Affect: Mood normal.         Behavior: Behavior normal.         Thought Content: Thought content normal.         Judgment: Judgment normal.                Signed Electronically by: Barry Jade DO    "

## 2025-07-29 LAB
C TRACH DNA SPEC QL PROBE+SIG AMP: NEGATIVE
C TRACH DNA SPEC QL PROBE+SIG AMP: NEGATIVE
N GONORRHOEA DNA SPEC QL NAA+PROBE: NEGATIVE
N GONORRHOEA DNA SPEC QL NAA+PROBE: NEGATIVE
SPECIMEN TYPE: NORMAL
SPECIMEN TYPE: NORMAL

## 2025-07-31 ENCOUNTER — LAB (OUTPATIENT)
Dept: LAB | Facility: CLINIC | Age: 50
End: 2025-07-31
Payer: COMMERCIAL

## 2025-07-31 DIAGNOSIS — F64.0 GENDER DYSPHORIA IN ADULT: ICD-10-CM

## 2025-07-31 DIAGNOSIS — E11.9 TYPE 2 DIABETES MELLITUS WITHOUT COMPLICATION, WITHOUT LONG-TERM CURRENT USE OF INSULIN (H): ICD-10-CM

## 2025-07-31 DIAGNOSIS — Z11.3 SCREENING FOR STDS (SEXUALLY TRANSMITTED DISEASES): ICD-10-CM

## 2025-07-31 LAB
CHOLEST SERPL-MCNC: 114 MG/DL
EST. AVERAGE GLUCOSE BLD GHB EST-MCNC: 128 MG/DL
ESTRADIOL SERPL-MCNC: 99 PG/ML
FASTING STATUS PATIENT QL REPORTED: NO
HBA1C MFR BLD: 6.1 % (ref 0–5.6)
HDLC SERPL-MCNC: 58 MG/DL
HIV 1+2 AB+HIV1 P24 AG SERPL QL IA: NONREACTIVE
LDLC SERPL CALC-MCNC: 45 MG/DL
NONHDLC SERPL-MCNC: 56 MG/DL
T PALLIDUM AB SER QL: NONREACTIVE
TRIGL SERPL-MCNC: 53 MG/DL

## 2025-08-02 LAB — TESTOST SERPL-MCNC: 23 NG/DL (ref 8–950)
